# Patient Record
Sex: MALE | Race: WHITE | Employment: FULL TIME | URBAN - METROPOLITAN AREA
[De-identification: names, ages, dates, MRNs, and addresses within clinical notes are randomized per-mention and may not be internally consistent; named-entity substitution may affect disease eponyms.]

---

## 2023-10-16 ENCOUNTER — HOSPITAL ENCOUNTER (EMERGENCY)
Age: 28
Discharge: HOME | End: 2023-10-17
Payer: COMMERCIAL

## 2023-10-16 VITALS
WEIGHT: 190 LBS | BODY MASS INDEX: 29.82 KG/M2 | OXYGEN SATURATION: 98 % | SYSTOLIC BLOOD PRESSURE: 123 MMHG | HEART RATE: 59 BPM | TEMPERATURE: 98.6 F | DIASTOLIC BLOOD PRESSURE: 64 MMHG | RESPIRATION RATE: 18 BRPM | HEIGHT: 67 IN

## 2023-10-16 DIAGNOSIS — S05.02XA ABRASION OF LEFT CORNEA, INITIAL ENCOUNTER: Primary | ICD-10-CM

## 2023-10-16 PROCEDURE — 99283 EMERGENCY DEPT VISIT LOW MDM: CPT

## 2023-10-16 ASSESSMENT — PAIN DESCRIPTION - ONSET: ONSET: ON-GOING

## 2023-10-16 ASSESSMENT — PAIN DESCRIPTION - DESCRIPTORS: DESCRIPTORS: DISCOMFORT

## 2023-10-16 ASSESSMENT — PAIN DESCRIPTION - LOCATION: LOCATION: EYE;HEAD

## 2023-10-16 ASSESSMENT — PAIN DESCRIPTION - ORIENTATION: ORIENTATION: LEFT

## 2023-10-16 ASSESSMENT — PAIN DESCRIPTION - FREQUENCY: FREQUENCY: CONTINUOUS

## 2023-10-16 ASSESSMENT — PAIN SCALES - GENERAL: PAINLEVEL_OUTOF10: 3

## 2023-10-17 PROCEDURE — 6370000000 HC RX 637 (ALT 250 FOR IP): Performed by: PHYSICIAN ASSISTANT

## 2023-10-17 RX ORDER — TETRACAINE HYDROCHLORIDE 5 MG/ML
2 SOLUTION OPHTHALMIC ONCE
Status: DISCONTINUED | OUTPATIENT
Start: 2023-10-17 | End: 2023-10-17 | Stop reason: HOSPADM

## 2023-10-17 RX ORDER — BACITRACIN ZINC AND POLYMYXIN B SULFATE 500; 10000 [USP'U]/G; [USP'U]/G
OINTMENT OPHTHALMIC 3 TIMES DAILY
Status: DISCONTINUED | OUTPATIENT
Start: 2023-10-17 | End: 2023-10-17 | Stop reason: HOSPADM

## 2023-10-17 RX ORDER — ERYTHROMYCIN 5 MG/G
OINTMENT OPHTHALMIC ONCE
Status: DISCONTINUED | OUTPATIENT
Start: 2023-10-17 | End: 2023-10-17

## 2023-10-17 RX ADMIN — BACITRACIN ZINC AND POLYMYXIN B SULFATE: 500; 10000 OINTMENT OPHTHALMIC at 01:21

## 2023-10-17 ASSESSMENT — ENCOUNTER SYMPTOMS
VOMITING: 0
EYE PAIN: 1
NAUSEA: 0
COUGH: 0
EYE REDNESS: 1
ABDOMINAL PAIN: 0
PHOTOPHOBIA: 1
VOICE CHANGE: 0
EYE DISCHARGE: 1

## 2023-10-17 ASSESSMENT — VISUAL ACUITY: OU: 1

## 2023-10-17 NOTE — DISCHARGE INSTRUCTIONS
Follow-up with ophthalmology tomorrow. Return to if any symptoms worsen or new symptoms develop. Continue Polysporin ophthalmic ointment 3 times daily for 5 days or until ophthalmology discontinues. Not drive or operate equipment until released by ophthalmology.

## 2023-10-17 NOTE — ED PROVIDER NOTES
[facility] ED  eMERGENCY dEPARTMENT eNCOUnter      Pt Name: Ward Nunez  MRN: [id number]  [address] 1995  Date of evaluation: 10/16/2023  Provider: Alyson Holguin PA-C    CHIEF COMPLAINT       Chief Complaint   Patient presents with    Foreign Body in Eye     Poss piece of metal         HISTORY OF PRESENT ILLNESS   (Location/Symptom, Timing/Onset,Context/Setting, Quality, Duration, Modifying Factors, Severity)  Note limiting factors. Ward Nunez is a 32 y.o. male who presents to the emergency department patient scratched eye with a piece of metal this morning. He notes that he has pain and dizziness his wife at bedside notes he has had blurred vision trouble seeing. Patient states that a piece of wire poked in the eye denies fever chills nausea vomiting. Symptoms moderate severity. HPI    NursingNotes were reviewed. REVIEW OF SYSTEMS    (2-9 systems for level 4, 10 or more for level 5)     Review of Systems   Constitutional:  Negative for activity change, appetite change and unexpected weight change. HENT:  Negative for ear discharge, nosebleeds and voice change. Eyes:  Positive for photophobia, pain, discharge, redness and visual disturbance. Respiratory:  Negative for cough. Cardiovascular:  Negative for chest pain. Gastrointestinal:  Negative for abdominal pain, nausea and vomiting. Musculoskeletal:  Negative for joint swelling. Skin:  Negative for pallor. Neurological:  Positive for light-headedness. Negative for weakness. Hematological:  Does not bruise/bleed easily. Psychiatric/Behavioral:  Negative for behavioral problems, self-injury and sleep disturbance. All other systems reviewed and are negative. Except as noted above the remainder of the review of systems was reviewed and negative. PAST MEDICAL HISTORY   No past medical history on file. SURGICALHISTORY     No past surgical history on file.       CURRENT MEDICATIONS     There are

## 2023-10-17 NOTE — ED NOTES
Visual acuity screening completed.  Right eye 20/20, left eye 20/25     Babita [place], [address]  10/17/23 0007

## 2023-11-01 ENCOUNTER — APPOINTMENT (OUTPATIENT)
Dept: PRIMARY CARE | Facility: CLINIC | Age: 28
End: 2023-11-01
Payer: COMMERCIAL

## 2023-11-04 ENCOUNTER — OFFICE VISIT (OUTPATIENT)
Dept: PRIMARY CARE | Facility: CLINIC | Age: 28
End: 2023-11-04
Payer: COMMERCIAL

## 2023-11-04 VITALS
SYSTOLIC BLOOD PRESSURE: 102 MMHG | DIASTOLIC BLOOD PRESSURE: 66 MMHG | HEART RATE: 64 BPM | OXYGEN SATURATION: 99 % | TEMPERATURE: 97.3 F | WEIGHT: 190 LBS | BODY MASS INDEX: 29.82 KG/M2 | HEIGHT: 67 IN

## 2023-11-04 DIAGNOSIS — H66.93 ACUTE INFECTION OF BOTH EARS: ICD-10-CM

## 2023-11-04 DIAGNOSIS — H92.03 OTALGIA OF BOTH EARS: ICD-10-CM

## 2023-11-04 DIAGNOSIS — H66.003 NON-RECURRENT ACUTE SUPPURATIVE OTITIS MEDIA OF BOTH EARS WITHOUT SPONTANEOUS RUPTURE OF TYMPANIC MEMBRANES: Primary | ICD-10-CM

## 2023-11-04 PROBLEM — F90.9 ADHD (ATTENTION DEFICIT HYPERACTIVITY DISORDER): Status: ACTIVE | Noted: 2023-11-04

## 2023-11-04 PROBLEM — N52.9 ERECTILE DYSFUNCTION: Status: ACTIVE | Noted: 2023-11-04

## 2023-11-04 PROBLEM — E55.9 VITAMIN D DEFICIENCY: Status: ACTIVE | Noted: 2023-11-04

## 2023-11-04 PROBLEM — F41.1 GENERALIZED ANXIETY DISORDER: Status: ACTIVE | Noted: 2023-11-04

## 2023-11-04 PROBLEM — K21.9 GASTROESOPHAGEAL REFLUX DISEASE: Status: ACTIVE | Noted: 2020-02-06

## 2023-11-04 PROBLEM — F32.A DEPRESSION: Status: ACTIVE | Noted: 2021-03-08

## 2023-11-04 PROCEDURE — 99214 OFFICE O/P EST MOD 30 MIN: CPT | Performed by: NURSE PRACTITIONER

## 2023-11-04 RX ORDER — SERTRALINE HYDROCHLORIDE 100 MG/1
100 TABLET, FILM COATED ORAL DAILY
COMMUNITY
End: 2024-02-22 | Stop reason: SDUPTHER

## 2023-11-04 RX ORDER — AMOXICILLIN 500 MG/1
500 CAPSULE ORAL EVERY 8 HOURS SCHEDULED
Qty: 21 CAPSULE | Refills: 0 | Status: SHIPPED | OUTPATIENT
Start: 2023-11-04 | End: 2023-11-11

## 2023-11-04 ASSESSMENT — PATIENT HEALTH QUESTIONNAIRE - PHQ9
2. FEELING DOWN, DEPRESSED OR HOPELESS: NOT AT ALL
1. LITTLE INTEREST OR PLEASURE IN DOING THINGS: NOT AT ALL
SUM OF ALL RESPONSES TO PHQ9 QUESTIONS 1 AND 2: 0

## 2023-11-04 ASSESSMENT — ENCOUNTER SYMPTOMS
LOSS OF SENSATION IN FEET: 0
DEPRESSION: 0
OCCASIONAL FEELINGS OF UNSTEADINESS: 0

## 2023-11-04 NOTE — PATIENT INSTRUCTIONS
DISCHARGE SUMMARY:   Patient was seen and examined. Diagnosis, treatment, treatment options, and possible complications of today's illness discussed and explained to patient. Patient to take medication/s associated with this visit. Patient may take OTC decongestant of choice as needed. Patient may also take OTC analgesic/antipyretic if needed for pain/fever. Advised to avoid ear manipulation / cleaning. Advised to avoid submerging on water. Advised to increase oral fluid intake. Advised to come back if with worsening or persistent symptoms. Patient verbalized understanding of plan of care.    Patient to come back in 7 - 10 days if needed for worsening symptoms.

## 2023-11-04 NOTE — PROGRESS NOTES
Subjective   Patient ID: Mac Perez is a 28 y.o. male who presents for Earache.    Patient presents today for evaluation of right ear pain. Onset was approximately 1 week ago. States he is not having a lot of pain, more hearing loss and at times he has a sharp pain and some pressure. States his left ear is starting to bother him a little. Denies any other symptoms     Earache          Review of Systems   HENT:  Positive for ear pain.        Objective   There were no vitals taken for this visit.    Physical Exam    Assessment/Plan

## 2023-11-04 NOTE — PROGRESS NOTES
Subjective   Patient ID: Mac Perez is a 28 y.o. male who is with chief complaint of pain on both ears (R>L).     HPI  Patient is a 28 y.o. male who CONSULTED AT [facility] today. Patient is with complaints of bilateral ear pain with right ear more painful than the left ear preceded by nasal congestion, nasal discharge, throat irritation, and  cough. Patient states that present condition started about 1 week ago as nasal congestion, watery nasal discharge, throat irritation and cough which was accompanied today by pain on both ears. He states these symptoms were not accompanied by fever, chills, nor any ear discharge. He denies shortness of breath, chest pain, palpitations, nor edema. He states that he tried OTC cough and cold medications which afforded relief of symptoms of runny nose, cough, and sore throat but the ear pain are still there. He denies nausea, vomiting, abdominal pain, nor any other symptoms.    Patient states he had his COVID vaccine.  Patient states he have not yet received flu shot for this season.    Review of Systems  General: no weight loss, generally healthy, no fatigue  Head:  no headaches / sinus pain, no vertigo, no injury  Eyes: no diplopia, no tearing, no pain,   Ears: (+) ear pain (R>L), no tinnitus, no bleeding, no vertigo  Mouth:  no dental difficulties, no gingival bleeding, (+) hx throat irritation, no loss of sense of taste  Nose: (+) hx congestion, (+) hx discharge, no bleeding, no obstruction, no loss of sense of smell  Neck: no stiffness, no pain, no tenderness, no masses, no bruit  Pulmonary: no dyspnea, no wheezing, no hemoptysis, (+) hx cough  Cardiovascular: no chest pain, no palpitations, no syncope, no orthopnea  Gastrointestinal: no change in appetite, no dysphagia, no abdominal pains, no diarrhea, no emesis, no melena  Genito Urinary: no dysuria, no urinary urgency, no nocturia, no incontinence, no change in nature of urine  Musculoskeletal: no  muscle ache, no joint pain, no limitation of range of motion, no paresthesia, no numbness  Constitutional: no fever, no chills, no night sweats    Objective   Physical Exam  General: ambulatory, in no acute distress  Head: normocephalic, no lesions  Eyes: pink palpebral conjunctiva, anicteric sclerae,  Ears: RIGHT AND LEFT EARS: EAC clear, no erythema, no congestion, no discharge, no bleeding; TM (+) bulging, (+) fluid level, (+) erythema and congestion of TM, no tragal tenderness,  Nose: nasal mucosa normal, no nasal discharge, no bleeding, no obstruction  Throat: clear, no exudate, no lesions  Neck: supple, no masses, no bruits  Chest: symmetrical chest expansion, no lagging, no retractions, clear breath sounds, no rales, no wheezes    Assessment/Plan   Problem List Items Addressed This Visit    None  Visit Diagnoses         Codes    Non-recurrent acute suppurative otitis media of both ears without spontaneous rupture of tympanic membranes    -  Primary H66.003    Relevant Medications    amoxicillin (Amoxil) 500 mg capsule    Otalgia of both ears     H92.03    Relevant Medications    amoxicillin (Amoxil) 500 mg capsule    Acute infection of both ears     H66.93    Relevant Medications    amoxicillin (Amoxil) 500 mg capsule        DISCHARGE SUMMARY:   Patient was seen and examined. Diagnosis, treatment, treatment options, and possible complications of today's illness discussed and explained to patient. Patient to take medication/s associated with this visit. Patient may take OTC decongestant of choice as needed. Patient may also take OTC analgesic/antipyretic if needed for pain/fever. Advised to avoid ear manipulation / cleaning. Advised to avoid submerging on water. Advised to increase oral fluid intake. Advised to come back if with worsening or persistent symptoms. Patient verbalized understanding of plan of care.    Patient to come back in 7 - 10 days if needed for worsening symptoms.

## 2023-11-06 ENCOUNTER — APPOINTMENT (OUTPATIENT)
Dept: PRIMARY CARE | Facility: CLINIC | Age: 28
End: 2023-11-06
Payer: COMMERCIAL

## 2023-12-11 ENCOUNTER — LAB (OUTPATIENT)
Dept: LAB | Facility: LAB | Age: 28
End: 2023-12-11
Payer: COMMERCIAL

## 2023-12-11 DIAGNOSIS — E55.9 VITAMIN D DEFICIENCY, UNSPECIFIED: Primary | ICD-10-CM

## 2023-12-11 LAB
25(OH)D3 SERPL-MCNC: 15 NG/ML (ref 30–100)
ALBUMIN SERPL BCP-MCNC: 4.7 G/DL (ref 3.4–5)
ALP SERPL-CCNC: 61 U/L (ref 33–120)
ALT SERPL W P-5'-P-CCNC: 14 U/L (ref 10–52)
ANION GAP SERPL CALC-SCNC: 11 MMOL/L (ref 10–20)
AST SERPL W P-5'-P-CCNC: 21 U/L (ref 9–39)
BASOPHILS # BLD AUTO: 0.05 X10*3/UL (ref 0–0.1)
BASOPHILS NFR BLD AUTO: 0.7 %
BILIRUB SERPL-MCNC: 0.4 MG/DL (ref 0–1.2)
BUN SERPL-MCNC: 15 MG/DL (ref 6–23)
CALCIUM SERPL-MCNC: 9.9 MG/DL (ref 8.6–10.3)
CHLORIDE SERPL-SCNC: 103 MMOL/L (ref 98–107)
CO2 SERPL-SCNC: 32 MMOL/L (ref 21–32)
CREAT SERPL-MCNC: 1.02 MG/DL (ref 0.5–1.3)
EOSINOPHIL # BLD AUTO: 0.06 X10*3/UL (ref 0–0.7)
EOSINOPHIL NFR BLD AUTO: 0.9 %
ERYTHROCYTE [DISTWIDTH] IN BLOOD BY AUTOMATED COUNT: 11.7 % (ref 11.5–14.5)
GFR SERPL CREATININE-BSD FRML MDRD: >90 ML/MIN/1.73M*2
GLUCOSE SERPL-MCNC: 78 MG/DL (ref 74–99)
HCT VFR BLD AUTO: 46.2 % (ref 41–52)
HGB BLD-MCNC: 15.6 G/DL (ref 13.5–17.5)
IMM GRANULOCYTES # BLD AUTO: 0.01 X10*3/UL (ref 0–0.7)
IMM GRANULOCYTES NFR BLD AUTO: 0.1 % (ref 0–0.9)
LYMPHOCYTES # BLD AUTO: 1.67 X10*3/UL (ref 1.2–4.8)
LYMPHOCYTES NFR BLD AUTO: 24.3 %
MCH RBC QN AUTO: 31.1 PG (ref 26–34)
MCHC RBC AUTO-ENTMCNC: 33.8 G/DL (ref 32–36)
MCV RBC AUTO: 92 FL (ref 80–100)
MONOCYTES # BLD AUTO: 0.45 X10*3/UL (ref 0.1–1)
MONOCYTES NFR BLD AUTO: 6.5 %
NEUTROPHILS # BLD AUTO: 4.64 X10*3/UL (ref 1.2–7.7)
NEUTROPHILS NFR BLD AUTO: 67.5 %
NRBC BLD-RTO: 0 /100 WBCS (ref 0–0)
PLATELET # BLD AUTO: 283 X10*3/UL (ref 150–450)
POTASSIUM SERPL-SCNC: 4.2 MMOL/L (ref 3.5–5.3)
PROT SERPL-MCNC: 7.2 G/DL (ref 6.4–8.2)
RBC # BLD AUTO: 5.02 X10*6/UL (ref 4.5–5.9)
SODIUM SERPL-SCNC: 142 MMOL/L (ref 136–145)
WBC # BLD AUTO: 6.9 X10*3/UL (ref 4.4–11.3)

## 2023-12-11 PROCEDURE — 85025 COMPLETE CBC W/AUTO DIFF WBC: CPT

## 2023-12-11 PROCEDURE — 80053 COMPREHEN METABOLIC PANEL: CPT

## 2023-12-11 PROCEDURE — 82306 VITAMIN D 25 HYDROXY: CPT

## 2023-12-11 PROCEDURE — 36415 COLL VENOUS BLD VENIPUNCTURE: CPT

## 2023-12-13 ENCOUNTER — OFFICE VISIT (OUTPATIENT)
Dept: PRIMARY CARE | Facility: CLINIC | Age: 28
End: 2023-12-13
Payer: COMMERCIAL

## 2023-12-13 VITALS
TEMPERATURE: 98.3 F | BODY MASS INDEX: 30.57 KG/M2 | HEIGHT: 67 IN | RESPIRATION RATE: 16 BRPM | HEART RATE: 52 BPM | DIASTOLIC BLOOD PRESSURE: 76 MMHG | OXYGEN SATURATION: 97 % | WEIGHT: 194.8 LBS | SYSTOLIC BLOOD PRESSURE: 121 MMHG

## 2023-12-13 DIAGNOSIS — Z00.00 ROUTINE GENERAL MEDICAL EXAMINATION AT A HEALTH CARE FACILITY: Primary | ICD-10-CM

## 2023-12-13 PROCEDURE — 99395 PREV VISIT EST AGE 18-39: CPT | Performed by: FAMILY MEDICINE

## 2023-12-13 PROCEDURE — 1036F TOBACCO NON-USER: CPT | Performed by: FAMILY MEDICINE

## 2023-12-13 ASSESSMENT — ENCOUNTER SYMPTOMS
BLOOD IN STOOL: 0
SINUS PRESSURE: 0
SHORTNESS OF BREATH: 0
SLEEP DISTURBANCE: 0
BRUISES/BLEEDS EASILY: 0
HALLUCINATIONS: 0
ARTHRALGIAS: 0
POLYDIPSIA: 0
UNEXPECTED WEIGHT CHANGE: 0
ABDOMINAL DISTENTION: 0
DYSPHORIC MOOD: 0
EYE DISCHARGE: 0
RHINORRHEA: 0
LIGHT-HEADEDNESS: 0
SORE THROAT: 0
SPEECH DIFFICULTY: 0
SEIZURES: 0
DIARRHEA: 0
PALPITATIONS: 0
FLANK PAIN: 0
ABDOMINAL PAIN: 0
CHILLS: 0
DIAPHORESIS: 0
HEADACHES: 0
VOICE CHANGE: 0
NUMBNESS: 0
FREQUENCY: 0
ADENOPATHY: 0
ACTIVITY CHANGE: 0
PHOTOPHOBIA: 0
WHEEZING: 0
NECK PAIN: 0
EYE ITCHING: 0
MYALGIAS: 0
TREMORS: 0
CONFUSION: 0
NAUSEA: 0
EYE PAIN: 0
BACK PAIN: 0
WEAKNESS: 0
FEVER: 0
DYSURIA: 0
CONSTIPATION: 0
CHOKING: 0
NECK STIFFNESS: 0
TROUBLE SWALLOWING: 0
EYE REDNESS: 0
HEMATURIA: 0
JOINT SWELLING: 0
FATIGUE: 0
DIZZINESS: 0
VOMITING: 0
WOUND: 0
FACIAL ASYMMETRY: 0
AGITATION: 0
APPETITE CHANGE: 0
COUGH: 0
NERVOUS/ANXIOUS: 0
CHEST TIGHTNESS: 0

## 2023-12-13 NOTE — PROGRESS NOTES
Subjective   Patient ID: Mac Perez is a 28 y.o. male who presents for Annual Exam (And review labs).    Subjective  Mac Perez is a 28 y.o. male and is here for a comprehensive physical exam. The patient reports no problems.    Do you take any herbs or supplements that were not prescribed by a doctor? no  Are you taking calcium supplements? no  Are you taking aspirin daily? no      History:  Any STD's in the past? none           Review of Systems   Constitutional:  Negative for activity change, appetite change, chills, diaphoresis, fatigue, fever and unexpected weight change.   HENT:  Negative for congestion, ear pain, hearing loss, nosebleeds, postnasal drip, rhinorrhea, sinus pressure, sneezing, sore throat, tinnitus, trouble swallowing and voice change.    Eyes:  Negative for photophobia, pain, discharge, redness, itching and visual disturbance.   Respiratory:  Negative for cough, choking, chest tightness, shortness of breath and wheezing.    Cardiovascular:  Negative for chest pain, palpitations and leg swelling.   Gastrointestinal:  Negative for abdominal distention, abdominal pain, blood in stool, constipation, diarrhea, nausea and vomiting.   Endocrine: Negative for cold intolerance, heat intolerance, polydipsia and polyuria.   Genitourinary:  Negative for dysuria, flank pain, frequency, hematuria and urgency.   Musculoskeletal:  Negative for arthralgias, back pain, joint swelling, myalgias, neck pain and neck stiffness.   Skin:  Negative for rash and wound.   Allergic/Immunologic: Negative for immunocompromised state.   Neurological:  Negative for dizziness, tremors, seizures, syncope, facial asymmetry, speech difficulty, weakness, light-headedness, numbness and headaches.   Hematological:  Negative for adenopathy. Does not bruise/bleed easily.   Psychiatric/Behavioral:  Negative for agitation, behavioral problems, confusion, dysphoric mood, hallucinations, self-injury, sleep disturbance and  "suicidal ideas. The patient is not nervous/anxious.        Objective   /76 (BP Location: Left arm, Patient Position: Sitting)   Pulse 52   Temp 36.8 °C (98.3 °F)   Resp 16   Ht 1.702 m (5' 7\")   Wt 88.4 kg (194 lb 12.8 oz)   SpO2 97%   BMI 30.51 kg/m²     Physical Exam  Constitutional:       General: He is not in acute distress.     Appearance: He is not ill-appearing or diaphoretic.   HENT:      Head: Normocephalic and atraumatic.      Right Ear: External ear normal.      Left Ear: External ear normal.      Nose: Nose normal. No rhinorrhea.   Eyes:      General: Lids are normal. No scleral icterus.        Right eye: No discharge.         Left eye: No discharge.      Conjunctiva/sclera: Conjunctivae normal.   Cardiovascular:      Rate and Rhythm: Normal rate and regular rhythm.      Pulses: Normal pulses.      Heart sounds: No murmur heard.  Pulmonary:      Effort: Pulmonary effort is normal. No respiratory distress.      Breath sounds: No decreased breath sounds, wheezing, rhonchi or rales.   Abdominal:      General: Bowel sounds are normal. There is no distension.      Palpations: Abdomen is soft. There is no mass.      Tenderness: There is no abdominal tenderness. There is no guarding or rebound.   Musculoskeletal:         General: No swelling, tenderness or deformity.      Cervical back: No rigidity or tenderness.      Right lower leg: No edema.      Left lower leg: No edema.   Lymphadenopathy:      Cervical: No cervical adenopathy.      Upper Body:      Right upper body: No supraclavicular adenopathy.      Left upper body: No supraclavicular adenopathy.   Skin:     General: Skin is warm and dry.      Coloration: Skin is not jaundiced or pale.      Findings: No erythema, lesion or rash.   Neurological:      General: No focal deficit present.      Mental Status: He is alert and oriented to person, place, and time.      Sensory: No sensory deficit.      Motor: No weakness or tremor.      Coordination: " Coordination normal.      Gait: Gait normal.   Psychiatric:         Mood and Affect: Mood normal. Affect is not inappropriate.         Behavior: Behavior normal.         Assessment/Plan   Diagnoses and all orders for this visit:  Routine general medical examination at a health care facility    2. Patient Counseling:  --Nutrition: Stressed importance of moderation in sodium/caffeine intake, saturated fat and cholesterol, caloric balance, sufficient intake of fresh fruits, vegetables, fiber, calcium, iron.  --Exercise: Stressed the importance of regular exercise.   --Substance Abuse: Discussed cessation/primary prevention of tobacco, alcohol, or other drug use; driving or other dangerous activities under the influence; availability of treatment for abuse.    --Sexuality: Discussed sexually transmitted diseases, partner selection, use of condoms, avoidance of unintended pregnancy  and contraceptive alternatives.   --Injury prevention: Discussed safety belts, safety helmets, smoke detector, smoking near bedding or upholstery.   --Dental health: Discussed importance of regular tooth brushing, flossing, and dental visits.  --Immunizations reviewed.  --Discussed benefits of screening colonoscopy.  3. Discussed the patient's BMI with him.  The BMI is above average. The patient received Current weight: 88.4 kg (194 lb 12.8 oz)  Weight change since last visit (-) denotes wt loss 4.8 lbs   Weight loss needed to achieve BMI 25: 35.5 Lbs  Weight loss needed to achieve BMI 30: 3.7 Lbs    Provided instructions on dietary changes  Provided instructions on exercise  Advised to Increase physical activity because they have an above normal BMI.  4. Follow up in one year

## 2024-02-22 DIAGNOSIS — F32.A DEPRESSION, UNSPECIFIED DEPRESSION TYPE: Primary | ICD-10-CM

## 2024-02-22 RX ORDER — SERTRALINE HYDROCHLORIDE 100 MG/1
100 TABLET, FILM COATED ORAL DAILY
Qty: 90 TABLET | Refills: 3 | Status: SHIPPED | OUTPATIENT
Start: 2024-02-22 | End: 2025-02-21

## 2024-07-13 ENCOUNTER — APPOINTMENT (OUTPATIENT)
Dept: RADIOLOGY | Facility: HOSPITAL | Age: 29
End: 2024-07-13
Payer: COMMERCIAL

## 2024-07-13 ENCOUNTER — HOSPITAL ENCOUNTER (EMERGENCY)
Facility: HOSPITAL | Age: 29
Discharge: HOME | End: 2024-07-14
Attending: EMERGENCY MEDICINE
Payer: COMMERCIAL

## 2024-07-13 DIAGNOSIS — L03.115 CELLULITIS OF RIGHT LOWER EXTREMITY: Primary | ICD-10-CM

## 2024-07-13 LAB
ALBUMIN SERPL BCP-MCNC: 4.3 G/DL (ref 3.4–5)
ALP SERPL-CCNC: 50 U/L (ref 33–120)
ALT SERPL W P-5'-P-CCNC: 14 U/L (ref 10–52)
ANION GAP SERPL CALC-SCNC: 18 MMOL/L (ref 10–20)
AST SERPL W P-5'-P-CCNC: 18 U/L (ref 9–39)
BASOPHILS # BLD AUTO: 0.03 X10*3/UL (ref 0–0.1)
BASOPHILS NFR BLD AUTO: 0.2 %
BILIRUB SERPL-MCNC: 0.6 MG/DL (ref 0–1.2)
BUN SERPL-MCNC: 14 MG/DL (ref 6–23)
CALCIUM SERPL-MCNC: 9.5 MG/DL (ref 8.6–10.3)
CHLORIDE SERPL-SCNC: 102 MMOL/L (ref 98–107)
CO2 SERPL-SCNC: 21 MMOL/L (ref 21–32)
CREAT SERPL-MCNC: 1.04 MG/DL (ref 0.5–1.3)
EGFRCR SERPLBLD CKD-EPI 2021: >90 ML/MIN/1.73M*2
EOSINOPHIL # BLD AUTO: 0 X10*3/UL (ref 0–0.7)
EOSINOPHIL NFR BLD AUTO: 0 %
ERYTHROCYTE [DISTWIDTH] IN BLOOD BY AUTOMATED COUNT: 12.4 % (ref 11.5–14.5)
GLUCOSE SERPL-MCNC: 94 MG/DL (ref 74–99)
HCT VFR BLD AUTO: 41 % (ref 41–52)
HGB BLD-MCNC: 14.8 G/DL (ref 13.5–17.5)
IMM GRANULOCYTES # BLD AUTO: 0.05 X10*3/UL (ref 0–0.7)
IMM GRANULOCYTES NFR BLD AUTO: 0.4 % (ref 0–0.9)
LYMPHOCYTES # BLD AUTO: 0.24 X10*3/UL (ref 1.2–4.8)
LYMPHOCYTES NFR BLD AUTO: 1.9 %
MCH RBC QN AUTO: 32.3 PG (ref 26–34)
MCHC RBC AUTO-ENTMCNC: 36.1 G/DL (ref 32–36)
MCV RBC AUTO: 90 FL (ref 80–100)
MONOCYTES # BLD AUTO: 0.4 X10*3/UL (ref 0.1–1)
MONOCYTES NFR BLD AUTO: 3.1 %
NEUTROPHILS # BLD AUTO: 12.18 X10*3/UL (ref 1.2–7.7)
NEUTROPHILS NFR BLD AUTO: 94.4 %
NRBC BLD-RTO: 0 /100 WBCS (ref 0–0)
PLATELET # BLD AUTO: 172 X10*3/UL (ref 150–450)
POTASSIUM SERPL-SCNC: 3.7 MMOL/L (ref 3.5–5.3)
PROT SERPL-MCNC: 6.8 G/DL (ref 6.4–8.2)
RBC # BLD AUTO: 4.58 X10*6/UL (ref 4.5–5.9)
SODIUM SERPL-SCNC: 137 MMOL/L (ref 136–145)
WBC # BLD AUTO: 12.9 X10*3/UL (ref 4.4–11.3)

## 2024-07-13 PROCEDURE — 87040 BLOOD CULTURE FOR BACTERIA: CPT | Mod: PARLAB | Performed by: EMERGENCY MEDICINE

## 2024-07-13 PROCEDURE — 83605 ASSAY OF LACTIC ACID: CPT | Performed by: EMERGENCY MEDICINE

## 2024-07-13 PROCEDURE — 2500000004 HC RX 250 GENERAL PHARMACY W/ HCPCS (ALT 636 FOR OP/ED): Performed by: EMERGENCY MEDICINE

## 2024-07-13 PROCEDURE — 99284 EMERGENCY DEPT VISIT MOD MDM: CPT | Mod: 25

## 2024-07-13 PROCEDURE — 71045 X-RAY EXAM CHEST 1 VIEW: CPT

## 2024-07-13 PROCEDURE — 36415 COLL VENOUS BLD VENIPUNCTURE: CPT | Performed by: EMERGENCY MEDICINE

## 2024-07-13 PROCEDURE — 85025 COMPLETE CBC W/AUTO DIFF WBC: CPT | Performed by: EMERGENCY MEDICINE

## 2024-07-13 PROCEDURE — 84075 ASSAY ALKALINE PHOSPHATASE: CPT | Performed by: EMERGENCY MEDICINE

## 2024-07-13 PROCEDURE — 93971 EXTREMITY STUDY: CPT

## 2024-07-13 PROCEDURE — 71045 X-RAY EXAM CHEST 1 VIEW: CPT | Performed by: RADIOLOGY

## 2024-07-13 PROCEDURE — 96365 THER/PROPH/DIAG IV INF INIT: CPT

## 2024-07-13 PROCEDURE — 96361 HYDRATE IV INFUSION ADD-ON: CPT

## 2024-07-13 RX ORDER — VANCOMYCIN HYDROCHLORIDE 1 G/20ML
INJECTION, POWDER, LYOPHILIZED, FOR SOLUTION INTRAVENOUS DAILY PRN
Status: DISCONTINUED | OUTPATIENT
Start: 2024-07-13 | End: 2024-07-13

## 2024-07-13 RX ORDER — ACETAMINOPHEN 325 MG/1
975 TABLET ORAL ONCE
Status: COMPLETED | OUTPATIENT
Start: 2024-07-13 | End: 2024-07-13

## 2024-07-13 RX ORDER — CEFTRIAXONE 1 G/50ML
1 INJECTION, SOLUTION INTRAVENOUS ONCE
Status: COMPLETED | OUTPATIENT
Start: 2024-07-13 | End: 2024-07-14

## 2024-07-13 ASSESSMENT — PAIN DESCRIPTION - LOCATION: LOCATION: LEG

## 2024-07-13 ASSESSMENT — COLUMBIA-SUICIDE SEVERITY RATING SCALE - C-SSRS
6. HAVE YOU EVER DONE ANYTHING, STARTED TO DO ANYTHING, OR PREPARED TO DO ANYTHING TO END YOUR LIFE?: NO
2. HAVE YOU ACTUALLY HAD ANY THOUGHTS OF KILLING YOURSELF?: NO
1. IN THE PAST MONTH, HAVE YOU WISHED YOU WERE DEAD OR WISHED YOU COULD GO TO SLEEP AND NOT WAKE UP?: NO

## 2024-07-13 ASSESSMENT — PAIN - FUNCTIONAL ASSESSMENT: PAIN_FUNCTIONAL_ASSESSMENT: 0-10

## 2024-07-13 ASSESSMENT — PAIN DESCRIPTION - PAIN TYPE: TYPE: ACUTE PAIN

## 2024-07-13 ASSESSMENT — PAIN SCALES - GENERAL: PAINLEVEL_OUTOF10: 7

## 2024-07-13 ASSESSMENT — PAIN DESCRIPTION - ORIENTATION: ORIENTATION: RIGHT

## 2024-07-14 VITALS
RESPIRATION RATE: 16 BRPM | HEIGHT: 67 IN | OXYGEN SATURATION: 99 % | TEMPERATURE: 99.7 F | DIASTOLIC BLOOD PRESSURE: 66 MMHG | SYSTOLIC BLOOD PRESSURE: 120 MMHG | HEART RATE: 89 BPM | BODY MASS INDEX: 29.82 KG/M2 | WEIGHT: 190 LBS

## 2024-07-14 LAB
APPEARANCE UR: CLEAR
BILIRUB UR STRIP.AUTO-MCNC: NEGATIVE MG/DL
COLOR UR: ABNORMAL
GLUCOSE UR STRIP.AUTO-MCNC: NORMAL MG/DL
HOLD SPECIMEN: NORMAL
KETONES UR STRIP.AUTO-MCNC: ABNORMAL MG/DL
LACTATE SERPL-SCNC: 1.1 MMOL/L (ref 0.4–2)
LEUKOCYTE ESTERASE UR QL STRIP.AUTO: NEGATIVE
NITRITE UR QL STRIP.AUTO: NEGATIVE
PH UR STRIP.AUTO: 5.5 [PH]
PROT UR STRIP.AUTO-MCNC: NEGATIVE MG/DL
RBC # UR STRIP.AUTO: NEGATIVE /UL
SP GR UR STRIP.AUTO: 1.02
UROBILINOGEN UR STRIP.AUTO-MCNC: NORMAL MG/DL

## 2024-07-14 PROCEDURE — 2500000005 HC RX 250 GENERAL PHARMACY W/O HCPCS: Performed by: EMERGENCY MEDICINE

## 2024-07-14 PROCEDURE — 81003 URINALYSIS AUTO W/O SCOPE: CPT | Performed by: EMERGENCY MEDICINE

## 2024-07-14 PROCEDURE — 96375 TX/PRO/DX INJ NEW DRUG ADDON: CPT

## 2024-07-14 PROCEDURE — 2500000004 HC RX 250 GENERAL PHARMACY W/ HCPCS (ALT 636 FOR OP/ED): Performed by: EMERGENCY MEDICINE

## 2024-07-14 PROCEDURE — 93971 EXTREMITY STUDY: CPT | Performed by: RADIOLOGY

## 2024-07-14 RX ORDER — SULFAMETHOXAZOLE AND TRIMETHOPRIM 800; 160 MG/1; MG/1
1 TABLET ORAL 2 TIMES DAILY
Qty: 14 TABLET | Refills: 0 | Status: SHIPPED | OUTPATIENT
Start: 2024-07-14 | End: 2024-07-20 | Stop reason: HOSPADM

## 2024-07-14 RX ORDER — CEPHALEXIN 500 MG/1
500 CAPSULE ORAL 4 TIMES DAILY
Qty: 40 CAPSULE | Refills: 0 | Status: SHIPPED | OUTPATIENT
Start: 2024-07-14 | End: 2024-07-20 | Stop reason: HOSPADM

## 2024-07-14 RX ORDER — HYDROCODONE BITARTRATE AND ACETAMINOPHEN 5; 325 MG/1; MG/1
1 TABLET ORAL EVERY 6 HOURS PRN
Qty: 12 TABLET | Refills: 0 | Status: SHIPPED | OUTPATIENT
Start: 2024-07-14 | End: 2024-07-20 | Stop reason: HOSPADM

## 2024-07-14 NOTE — ED PROVIDER NOTES
HPI   Chief Complaint   Patient presents with    Leg Swelling    Leg Pain     PT. ARRIVED VIA PRIVATE CAR, RIDE PROVIDED, TO ED FROM HOME FOR RT LEG  PAIN/SWELLING. PT. STATES RECENT POISON JONA TO RT LEG X1WK, HAS BEEN GOING AWAY. PT. STATES NOTICING SWELLING TO RT LEG TODAY AND IS WARM TO TOUCH, WIFE IS NURSE AND WAS CONCERNED FOR CELLULITIS. PT. HAS + PULSES, FULL ROM, PAIN W/ AMBULATION.       Patient is an otherwise healthy 28-year-old male, denies significant medical history, presents to the emergency department fever, chills, and right leg swelling.  Patient states that about a week ago, he had poison ivy of the leg.  He states that seem to be resolving.  Over the past 24 hours, he began to have increasing redness, pain, along with chills and sweats.  He states today, despite ibuprofen and Tylenol, he was still having persistent fever and chills.  He denies any history of immunosuppression.  He is otherwise been in his normal state of health.                          Yanira Coma Scale Score: 15                     Patient History   Past Medical History:   Diagnosis Date    Deficiency of other specified B group vitamins 10/27/2022    Vitamin B12 deficiency     History reviewed. No pertinent surgical history.  Family History   Problem Relation Name Age of Onset    Hypertension Mother      Pancreatic cancer Father      Diabetes Brother       Social History     Tobacco Use    Smoking status: Never     Passive exposure: Never    Smokeless tobacco: Never   Vaping Use    Vaping status: Every Day    Substances: Nicotine   Substance Use Topics    Alcohol use: Yes     Alcohol/week: 5.0 standard drinks of alcohol     Types: 5 Standard drinks or equivalent per week     Comment: occ    Drug use: Never       Physical Exam   ED Triage Vitals [07/13/24 2203]   Temperature Heart Rate Respirations BP   37.6 °C (99.7 °F) 99 18 114/68      Pulse Ox Temp Source Heart Rate Source Patient Position   98 % Temporal Monitor Sitting       BP Location FiO2 (%)     Right arm --       Physical Exam  Vitals and nursing note reviewed.   Constitutional:       General: He is not in acute distress.     Appearance: He is well-developed.   HENT:      Head: Normocephalic and atraumatic.   Eyes:      Conjunctiva/sclera: Conjunctivae normal.   Cardiovascular:      Rate and Rhythm: Normal rate and regular rhythm.      Heart sounds: No murmur heard.  Pulmonary:      Effort: Pulmonary effort is normal. No respiratory distress.      Breath sounds: Normal breath sounds.   Abdominal:      Palpations: Abdomen is soft.      Tenderness: There is no abdominal tenderness.   Musculoskeletal:         General: No swelling.      Cervical back: Neck supple.      Right lower leg: Edema present.      Comments: Mild erythema of the right lower extremity.  Lymphangitic streak.   Skin:     General: Skin is warm and dry.      Capillary Refill: Capillary refill takes less than 2 seconds.   Neurological:      Mental Status: He is alert.   Psychiatric:         Mood and Affect: Mood normal.         ED Course & MDM   ED Course as of 07/14/24 0100   Sat Jul 13, 2024   2315 Leukocytosis of 12.9. [MK]   2337 Metabolic panel unremarkable. [MK]   Sun Jul 14, 2024   0040 Chemistry panel unremarkable.  Lactic acid normal.  Urine shows no infection. [MK]   0053 Ultrasound shows no into DVT. [MK]      ED Course User Index  [MK] Jeffry Galloway MD         Diagnoses as of 07/14/24 0100   Cellulitis of right lower extremity       Medical Decision Making  Medical Decision Making: Patient presents with fever, chills, and evidence of cellulitis.  There is no crepitus that makes me concerned for necrotizing fasciitis.  He did have recent skin breakdown secondary to poison ivy.  Patient did have a low-grade fever so metabolic workup was pursued.  Labs do show mild leukocytosis but are otherwise unremarkable.  Ultrasound was also obtained which does not show any evidence of DVT.  Patient was treated  with IV antibiotics.  I did discuss options with the patient.  He has no history of immunosuppression.  His pain is controlled.  He is not febrile.  He wants to trial outpatient therapy.  I do feel that this is reasonable.  He will be placed on antibiotics.  I did  him that if his symptoms are worsening or not improving within the next 24 hours to return.  He is agreeable with plan of care.    Differential Diagnoses Considered: Cellulitis, lymphangitis, DVT, sepsis    Chronic Medical Conditions Significantly Affecting Care: No significant medical history    External Records Reviewed: I reviewed recent and relevant outside records including: No recent    Independent Interpretation of Studies:  I independently interpreted: It is ultrasound and chest x-ray obtained and reviewed    Escalation of Care:  Appropriate for outpatient management after discussion with    Social Determinants of Health Significantly Affecting Care:  No social determinants    Prescription Drug Consideration: Keflex and Bactrim    Diagnostic testing considered: Noncontributory          Procedure  Procedures     Jeffry Galloway MD  07/14/24 0100

## 2024-07-14 NOTE — ED TRIAGE NOTES
PT. ARRIVED VIA PRIVATE CAR, RIDE PROVIDED, TO ED FROM HOME FOR RT LEG  PAIN/SWELLING. PT. STATES RECENT POISON JONA TO RT LEG X1WK, HAS BEEN GOING AWAY. PT. STATES NOTICING SWELLING TO RT LEG TODAY AND IS WARM TO TOUCH, WIFE IS NURSE AND WAS CONCERNED FOR CELLULITIS. PT. HAS + PULSES, FULL ROM, PAIN W/ AMBULATION.

## 2024-07-14 NOTE — CONSULTS
Vancomycin Dosing by Pharmacy- EMERGENCY DEPARTMENT    Mac Perez is a 28 y.o. year old male who Pharmacy has been consulted to give a ONE TIME ONLY vancomycin dose in the Emergency Department for cellulitis, skin and soft tissue.     Visit Vitals  /68 (BP Location: Right arm, Patient Position: Sitting)   Pulse 99   Temp 37.6 °C (99.7 °F) (Temporal)   Resp 18      Lab Results   Component Value Date    CREATININE 1.04 07/13/2024    CREATININE 1.02 12/11/2023    CREATININE 1.18 10/27/2022      Wt Readings from Last 1 Encounters:   07/13/24 86.2 kg (190 lb)    Assessment/Plan   Patient will be given a one time loading dose of 2000 mg  Pharmacy will sign off at this time. If vancomycin is to be continued, please re-consult Pharmacy.   Rosalba Gagnon [facility]

## 2024-07-15 ENCOUNTER — APPOINTMENT (OUTPATIENT)
Dept: RADIOLOGY | Facility: HOSPITAL | Age: 29
End: 2024-07-15
Payer: COMMERCIAL

## 2024-07-15 ENCOUNTER — APPOINTMENT (OUTPATIENT)
Dept: VASCULAR MEDICINE | Facility: HOSPITAL | Age: 29
End: 2024-07-15
Payer: COMMERCIAL

## 2024-07-15 ENCOUNTER — HOSPITAL ENCOUNTER (INPATIENT)
Facility: HOSPITAL | Age: 29
LOS: 5 days | Discharge: HOME | End: 2024-07-20
Attending: EMERGENCY MEDICINE | Admitting: INTERNAL MEDICINE
Payer: COMMERCIAL

## 2024-07-15 DIAGNOSIS — L03.115 CELLULITIS OF RIGHT LOWER EXTREMITY: ICD-10-CM

## 2024-07-15 DIAGNOSIS — L02.415 ABSCESS OF RIGHT LOWER EXTREMITY: Primary | ICD-10-CM

## 2024-07-15 LAB
ALBUMIN SERPL BCP-MCNC: 3.7 G/DL (ref 3.4–5)
ALP SERPL-CCNC: 67 U/L (ref 33–120)
ALT SERPL W P-5'-P-CCNC: 46 U/L (ref 10–52)
ANION GAP SERPL CALC-SCNC: 10 MMOL/L (ref 10–20)
AST SERPL W P-5'-P-CCNC: 53 U/L (ref 9–39)
BASOPHILS # BLD MANUAL: 0.38 X10*3/UL (ref 0–0.1)
BASOPHILS NFR BLD MANUAL: 4 %
BILIRUB SERPL-MCNC: 0.5 MG/DL (ref 0–1.2)
BUN SERPL-MCNC: 11 MG/DL (ref 6–23)
CALCIUM SERPL-MCNC: 9.1 MG/DL (ref 8.6–10.3)
CHLORIDE SERPL-SCNC: 100 MMOL/L (ref 98–107)
CO2 SERPL-SCNC: 27 MMOL/L (ref 21–32)
CREAT SERPL-MCNC: 1.23 MG/DL (ref 0.5–1.3)
CRP SERPL-MCNC: 29.92 MG/DL
EGFRCR SERPLBLD CKD-EPI 2021: 82 ML/MIN/1.73M*2
EOSINOPHIL # BLD MANUAL: 0.28 X10*3/UL (ref 0–0.7)
EOSINOPHIL NFR BLD MANUAL: 3 %
ERYTHROCYTE [DISTWIDTH] IN BLOOD BY AUTOMATED COUNT: 12.9 % (ref 11.5–14.5)
ERYTHROCYTE [SEDIMENTATION RATE] IN BLOOD BY WESTERGREN METHOD: 8 MM/H (ref 0–15)
GLUCOSE SERPL-MCNC: 117 MG/DL (ref 74–99)
HCT VFR BLD AUTO: 40.8 % (ref 41–52)
HGB BLD-MCNC: 14.4 G/DL (ref 13.5–17.5)
IMM GRANULOCYTES # BLD AUTO: 0.12 X10*3/UL (ref 0–0.7)
IMM GRANULOCYTES NFR BLD AUTO: 1.3 % (ref 0–0.9)
LACTATE SERPL-SCNC: 1 MMOL/L (ref 0.4–2)
LYMPHOCYTES # BLD MANUAL: 0.28 X10*3/UL (ref 1.2–4.8)
LYMPHOCYTES NFR BLD MANUAL: 3 %
MCH RBC QN AUTO: 32.2 PG (ref 26–34)
MCHC RBC AUTO-ENTMCNC: 35.3 G/DL (ref 32–36)
MCV RBC AUTO: 91 FL (ref 80–100)
METAMYELOCYTES # BLD MANUAL: 0.09 X10*3/UL
METAMYELOCYTES NFR BLD MANUAL: 1 %
MONOCYTES # BLD MANUAL: 0.19 X10*3/UL (ref 0.1–1)
MONOCYTES NFR BLD MANUAL: 2 %
NEUTROPHILS # BLD MANUAL: 8.18 X10*3/UL (ref 1.2–7.7)
NEUTS BAND # BLD MANUAL: 4.14 X10*3/UL (ref 0–0.7)
NEUTS BAND NFR BLD MANUAL: 44 %
NEUTS SEG # BLD MANUAL: 4.04 X10*3/UL (ref 1.2–7)
NEUTS SEG NFR BLD MANUAL: 43 %
NRBC BLD-RTO: 0 /100 WBCS (ref 0–0)
PLATELET # BLD AUTO: 150 X10*3/UL (ref 150–450)
POTASSIUM SERPL-SCNC: 3.8 MMOL/L (ref 3.5–5.3)
PROT SERPL-MCNC: 6.7 G/DL (ref 6.4–8.2)
RBC # BLD AUTO: 4.47 X10*6/UL (ref 4.5–5.9)
RBC MORPH BLD: ABNORMAL
SODIUM SERPL-SCNC: 133 MMOL/L (ref 136–145)
TOTAL CELLS COUNTED BLD: 100
WBC # BLD AUTO: 9.4 X10*3/UL (ref 4.4–11.3)

## 2024-07-15 PROCEDURE — 85007 BL SMEAR W/DIFF WBC COUNT: CPT

## 2024-07-15 PROCEDURE — 96375 TX/PRO/DX INJ NEW DRUG ADDON: CPT

## 2024-07-15 PROCEDURE — 2500000005 HC RX 250 GENERAL PHARMACY W/O HCPCS

## 2024-07-15 PROCEDURE — 73552 X-RAY EXAM OF FEMUR 2/>: CPT | Mod: RT

## 2024-07-15 PROCEDURE — 73590 X-RAY EXAM OF LOWER LEG: CPT | Mod: RT

## 2024-07-15 PROCEDURE — 2500000004 HC RX 250 GENERAL PHARMACY W/ HCPCS (ALT 636 FOR OP/ED): Performed by: PHYSICIAN ASSISTANT

## 2024-07-15 PROCEDURE — 36415 COLL VENOUS BLD VENIPUNCTURE: CPT

## 2024-07-15 PROCEDURE — 2500000004 HC RX 250 GENERAL PHARMACY W/ HCPCS (ALT 636 FOR OP/ED)

## 2024-07-15 PROCEDURE — 2500000001 HC RX 250 WO HCPCS SELF ADMINISTERED DRUGS (ALT 637 FOR MEDICARE OP): Performed by: INTERNAL MEDICINE

## 2024-07-15 PROCEDURE — 73552 X-RAY EXAM OF FEMUR 2/>: CPT | Mod: RIGHT SIDE | Performed by: STUDENT IN AN ORGANIZED HEALTH CARE EDUCATION/TRAINING PROGRAM

## 2024-07-15 PROCEDURE — 83605 ASSAY OF LACTIC ACID: CPT

## 2024-07-15 PROCEDURE — 87205 SMEAR GRAM STAIN: CPT | Mod: PARLAB | Performed by: PHYSICIAN ASSISTANT

## 2024-07-15 PROCEDURE — 99285 EMERGENCY DEPT VISIT HI MDM: CPT

## 2024-07-15 PROCEDURE — 86140 C-REACTIVE PROTEIN: CPT | Performed by: PHYSICIAN ASSISTANT

## 2024-07-15 PROCEDURE — 96365 THER/PROPH/DIAG IV INF INIT: CPT

## 2024-07-15 PROCEDURE — 80053 COMPREHEN METABOLIC PANEL: CPT

## 2024-07-15 PROCEDURE — 99223 1ST HOSP IP/OBS HIGH 75: CPT | Performed by: PHYSICIAN ASSISTANT

## 2024-07-15 PROCEDURE — 96361 HYDRATE IV INFUSION ADD-ON: CPT

## 2024-07-15 PROCEDURE — 73590 X-RAY EXAM OF LOWER LEG: CPT | Mod: RIGHT SIDE | Performed by: STUDENT IN AN ORGANIZED HEALTH CARE EDUCATION/TRAINING PROGRAM

## 2024-07-15 PROCEDURE — 87040 BLOOD CULTURE FOR BACTERIA: CPT | Mod: PARLAB

## 2024-07-15 PROCEDURE — 85027 COMPLETE CBC AUTOMATED: CPT

## 2024-07-15 PROCEDURE — 1100000001 HC PRIVATE ROOM DAILY

## 2024-07-15 PROCEDURE — 85652 RBC SED RATE AUTOMATED: CPT | Performed by: PHYSICIAN ASSISTANT

## 2024-07-15 RX ORDER — ACETAMINOPHEN 325 MG/1
650 TABLET ORAL EVERY 4 HOURS PRN
Status: DISCONTINUED | OUTPATIENT
Start: 2024-07-15 | End: 2024-07-20 | Stop reason: HOSPADM

## 2024-07-15 RX ORDER — ACETAMINOPHEN 325 MG/1
975 TABLET ORAL ONCE
Status: COMPLETED | OUTPATIENT
Start: 2024-07-15 | End: 2024-07-15

## 2024-07-15 RX ORDER — ONDANSETRON HYDROCHLORIDE 2 MG/ML
4 INJECTION, SOLUTION INTRAVENOUS EVERY 6 HOURS PRN
Status: DISCONTINUED | OUTPATIENT
Start: 2024-07-15 | End: 2024-07-20 | Stop reason: HOSPADM

## 2024-07-15 RX ORDER — ACETAMINOPHEN 160 MG/5ML
650 SOLUTION ORAL EVERY 4 HOURS PRN
Status: DISCONTINUED | OUTPATIENT
Start: 2024-07-15 | End: 2024-07-20 | Stop reason: HOSPADM

## 2024-07-15 RX ORDER — SODIUM CHLORIDE 9 MG/ML
100 INJECTION, SOLUTION INTRAVENOUS CONTINUOUS
Status: ACTIVE | OUTPATIENT
Start: 2024-07-15 | End: 2024-07-16

## 2024-07-15 RX ORDER — MORPHINE SULFATE 2 MG/ML
2 INJECTION, SOLUTION INTRAMUSCULAR; INTRAVENOUS EVERY 4 HOURS PRN
Status: DISCONTINUED | OUTPATIENT
Start: 2024-07-15 | End: 2024-07-15

## 2024-07-15 RX ORDER — ONDANSETRON 4 MG/1
4 TABLET, FILM COATED ORAL EVERY 6 HOURS PRN
Status: DISCONTINUED | OUTPATIENT
Start: 2024-07-15 | End: 2024-07-20 | Stop reason: HOSPADM

## 2024-07-15 RX ORDER — MORPHINE SULFATE 4 MG/ML
4 INJECTION, SOLUTION INTRAMUSCULAR; INTRAVENOUS EVERY 4 HOURS PRN
Status: DISCONTINUED | OUTPATIENT
Start: 2024-07-15 | End: 2024-07-15

## 2024-07-15 RX ORDER — SERTRALINE HYDROCHLORIDE 100 MG/1
100 TABLET, FILM COATED ORAL DAILY
Status: DISCONTINUED | OUTPATIENT
Start: 2024-07-15 | End: 2024-07-16

## 2024-07-15 RX ORDER — TRAMADOL HYDROCHLORIDE 50 MG/1
50 TABLET ORAL EVERY 6 HOURS PRN
Status: DISCONTINUED | OUTPATIENT
Start: 2024-07-15 | End: 2024-07-16

## 2024-07-15 RX ORDER — KETOROLAC TROMETHAMINE 30 MG/ML
15 INJECTION, SOLUTION INTRAMUSCULAR; INTRAVENOUS ONCE
Status: COMPLETED | OUTPATIENT
Start: 2024-07-15 | End: 2024-07-15

## 2024-07-15 SDOH — SOCIAL STABILITY: SOCIAL INSECURITY: WERE YOU ABLE TO COMPLETE ALL THE BEHAVIORAL HEALTH SCREENINGS?: YES

## 2024-07-15 SDOH — SOCIAL STABILITY: SOCIAL INSECURITY: ABUSE: ADULT

## 2024-07-15 SDOH — SOCIAL STABILITY: SOCIAL INSECURITY: HAVE YOU HAD ANY THOUGHTS OF HARMING ANYONE ELSE?: NO

## 2024-07-15 SDOH — SOCIAL STABILITY: SOCIAL INSECURITY: DO YOU FEEL UNSAFE GOING BACK TO THE PLACE WHERE YOU ARE LIVING?: NO

## 2024-07-15 SDOH — SOCIAL STABILITY: SOCIAL INSECURITY: HAVE YOU HAD THOUGHTS OF HARMING ANYONE ELSE?: NO

## 2024-07-15 SDOH — SOCIAL STABILITY: SOCIAL INSECURITY: DOES ANYONE TRY TO KEEP YOU FROM HAVING/CONTACTING OTHER FRIENDS OR DOING THINGS OUTSIDE YOUR HOME?: NO

## 2024-07-15 SDOH — SOCIAL STABILITY: SOCIAL INSECURITY: HAS ANYONE EVER THREATENED TO HURT YOUR FAMILY OR YOUR PETS?: NO

## 2024-07-15 SDOH — SOCIAL STABILITY: SOCIAL INSECURITY: DO YOU FEEL ANYONE HAS EXPLOITED OR TAKEN ADVANTAGE OF YOU FINANCIALLY OR OF YOUR PERSONAL PROPERTY?: NO

## 2024-07-15 SDOH — SOCIAL STABILITY: SOCIAL INSECURITY: ARE THERE ANY APPARENT SIGNS OF INJURIES/BEHAVIORS THAT COULD BE RELATED TO ABUSE/NEGLECT?: NO

## 2024-07-15 SDOH — SOCIAL STABILITY: SOCIAL INSECURITY: ARE YOU OR HAVE YOU BEEN THREATENED OR ABUSED PHYSICALLY, EMOTIONALLY, OR SEXUALLY BY ANYONE?: NO

## 2024-07-15 ASSESSMENT — PATIENT HEALTH QUESTIONNAIRE - PHQ9
2. FEELING DOWN, DEPRESSED OR HOPELESS: NOT AT ALL
1. LITTLE INTEREST OR PLEASURE IN DOING THINGS: NOT AT ALL
SUM OF ALL RESPONSES TO PHQ9 QUESTIONS 1 & 2: 0

## 2024-07-15 ASSESSMENT — LIFESTYLE VARIABLES
HOW OFTEN DO YOU HAVE 6 OR MORE DRINKS ON ONE OCCASION: NEVER
TOTAL SCORE: 0
AUDIT-C TOTAL SCORE: 0
SUBSTANCE_ABUSE_PAST_12_MONTHS: NO
AUDIT-C TOTAL SCORE: 0
EVER FELT BAD OR GUILTY ABOUT YOUR DRINKING: NO
HAVE PEOPLE ANNOYED YOU BY CRITICIZING YOUR DRINKING: NO
HOW MANY STANDARD DRINKS CONTAINING ALCOHOL DO YOU HAVE ON A TYPICAL DAY: PATIENT DOES NOT DRINK
PRESCIPTION_ABUSE_PAST_12_MONTHS: NO
EVER HAD A DRINK FIRST THING IN THE MORNING TO STEADY YOUR NERVES TO GET RID OF A HANGOVER: NO
SKIP TO QUESTIONS 9-10: 1
HAVE YOU EVER FELT YOU SHOULD CUT DOWN ON YOUR DRINKING: NO
HOW OFTEN DO YOU HAVE A DRINK CONTAINING ALCOHOL: NEVER

## 2024-07-15 ASSESSMENT — COLUMBIA-SUICIDE SEVERITY RATING SCALE - C-SSRS
6. HAVE YOU EVER DONE ANYTHING, STARTED TO DO ANYTHING, OR PREPARED TO DO ANYTHING TO END YOUR LIFE?: NO
6. HAVE YOU EVER DONE ANYTHING, STARTED TO DO ANYTHING, OR PREPARED TO DO ANYTHING TO END YOUR LIFE?: NO
2. HAVE YOU ACTUALLY HAD ANY THOUGHTS OF KILLING YOURSELF?: NO
1. IN THE PAST MONTH, HAVE YOU WISHED YOU WERE DEAD OR WISHED YOU COULD GO TO SLEEP AND NOT WAKE UP?: NO
1. IN THE PAST MONTH, HAVE YOU WISHED YOU WERE DEAD OR WISHED YOU COULD GO TO SLEEP AND NOT WAKE UP?: NO
2. HAVE YOU ACTUALLY HAD ANY THOUGHTS OF KILLING YOURSELF?: NO

## 2024-07-15 ASSESSMENT — ACTIVITIES OF DAILY LIVING (ADL)
FEEDING YOURSELF: INDEPENDENT
ADEQUATE_TO_COMPLETE_ADL: YES
TOILETING: INDEPENDENT
BATHING: INDEPENDENT
LACK_OF_TRANSPORTATION: NO
DRESSING YOURSELF: INDEPENDENT
JUDGMENT_ADEQUATE_SAFELY_COMPLETE_DAILY_ACTIVITIES: YES
PATIENT'S MEMORY ADEQUATE TO SAFELY COMPLETE DAILY ACTIVITIES?: YES
WALKS IN HOME: INDEPENDENT
HEARING - RIGHT EAR: FUNCTIONAL
GROOMING: INDEPENDENT
HEARING - LEFT EAR: FUNCTIONAL

## 2024-07-15 ASSESSMENT — COGNITIVE AND FUNCTIONAL STATUS - GENERAL
PATIENT BASELINE BEDBOUND: NO
MOBILITY SCORE: 24
DAILY ACTIVITIY SCORE: 24

## 2024-07-15 ASSESSMENT — PAIN - FUNCTIONAL ASSESSMENT
PAIN_FUNCTIONAL_ASSESSMENT: 0-10

## 2024-07-15 ASSESSMENT — PAIN SCALES - PAIN ASSESSMENT IN ADVANCED DEMENTIA (PAINAD): TOTALSCORE: MEDICATION (SEE MAR)

## 2024-07-15 ASSESSMENT — PAIN SCALES - GENERAL
PAINLEVEL_OUTOF10: 6
PAINLEVEL_OUTOF10: 0 - NO PAIN
PAINLEVEL_OUTOF10: 7
PAINLEVEL_OUTOF10: 5 - MODERATE PAIN

## 2024-07-15 NOTE — CONSULTS
Vancomycin Dosing by Pharmacy- INITIAL    Mac Perez is a 28 y.o. year old male who Pharmacy has been consulted for vancomycin dosing for cellulitis, skin and soft tissue. Based on the patient's indication and renal status this patient will be dosed based on a goal AUC of 400-600.     Renal function is currently declining.    Visit Vitals  /55 (BP Location: Left arm, Patient Position: Sitting)   Pulse 80   Temp (!) 38.1 °C (100.6 °F) (Temporal) Comment: ANAYELI Álvarez notified   Resp (!) 28 Comment: ANAYELI Álvarez notified        Lab Results   Component Value Date    CREATININE 1.23 07/15/2024    CREATININE 1.04 2024    CREATININE 1.02 2023    CREATININE 1.18 10/27/2022        Patient weight is as follows:   Vitals:    07/15/24 1127   Weight: 86.2 kg (190 lb)       Cultures:  No results found for the encounter in last 14 days.        No intake/output data recorded.  I/O during current shift:  No intake/output data recorded.    Temp (24hrs), Av.9 °C (98.5 °F), Min:35.8 °C (96.4 °F), Max:38.1 °C (100.6 °F)         Assessment/Plan     Patient has already been given a loading dose of 2000 mg on 7/15/24 at 1329.  Will initiate vancomycin maintenance,  1250 mg every 12 hours.    This dosing regimen is predicted by [employer] to result in the following pharmacokinetic parameters:  Regimen: 1250 mg IV every 12 hours.  Start time: 01:29 on 2024  Exposure target: AUC24 (range)400-600 mg/L.hr   AUC24,ss: 589 mg/L.hr  Probability of AUC24 > 400: 86 %  Ctrough,ss: 18.7 mg/L  Probability of Ctrough,ss > 20: 44 %  Probability of nephrotoxicity (Lodise BELÉN ): 15 %      Follow-up level will be ordered on  at Mid-AM unless clinically indicated sooner.  Will continue to monitor renal function daily while on vancomycin and order serum creatinine at least every 48 hours if not already ordered.  Follow for continued vancomycin needs, clinical response, and signs/symptoms of toxicity.       Melany Lowe,  PharmD

## 2024-07-15 NOTE — H&P
History Of Present Illness  Mac Perez is a 28 y.o. male with no significant past medical history presents to the [facility] ED with complaints of worsening pain, swelling, and redness to his right lower extremity.  Reports approximately 1.5 weeks ago, he had poison ivy on the right lower leg which does seem to be resolving, notes small healing scabs on the right anterior ankle.  2-3 days ago he noted increasing redness, pain, chills, and sweats.  Says him and his wife were having a family party at their house Saturday when they noticed he started limping around and developed diaphoresis and fevers.  He was evaluated in the ED 2 days ago, at which time he was found to have edema, mild erythema, and lymphangitic streaking of the right lower extremity.  Ultrasound negative for DVT at that time.  Patient discharged on Keflex and Bactrim.  Patient was thought to have cellulitis secondary to wounds from poison ivy.  Patient notes worsening of symptoms since his ED visit.  Denies any drainage.  Denies bleeding.  States his right lower extremity is very painful, more swollen, and warm to touch.  Also states they noticed increased redness to his right anterior thigh that has progressively worsened.  States the erythematous area has progressed to his upper thigh, wife had made an outline of the red area last night and so far today it has spread quite a bit.  Admits to difficulty with ambulation secondary to the pain.  Admits to mild headaches.  Admits to fevers, chills, and sweats.  Denies dizziness, shortness of breath, chest pains, abdominal pain, nausea or vomiting, difficulty with eating, changes in urination/bowel movements, or weakness.  Admits to vaping and occasional alcohol use.  Denies drug use.  Does not note any significant medical history, cardiac issues, or lung issues.    ED course: On arrival to the ED, patient was afebrile, tachycardic with heart rate 104, otherwise hemodynamically stable with SpO2 100% on  room air.  Glucose 117, sodium 133.  AST 53.  Lactate 1.0.  WBC 9.4 with left shift (WBC 12.9 on 7/13).  Urinalysis from 7/14 unremarkable.  Ultrasound of right lower extremity negative for DVT on 7/14.  X-ray of right femur and right tibia/fibula unremarkable.  Patient given Tylenol, Toradol, Zosyn, NS bolus, and vancomycin in the ED.    Admitting provider is Dr. Jackson     Past Medical History  Past Medical History:   Diagnosis Date    Deficiency of other specified B group vitamins 10/27/2022    Vitamin B12 deficiency     Surgical History  No past surgical history on file.     Social History  Admits to vaping daily.  Admits to occasional alcohol use.  Denies drug use.  Denies smoking cigarettes.    Family History  Family History   Problem Relation Name Age of Onset    Hypertension Mother      Pancreatic cancer Father      Diabetes Brother       Allergies  Doxycycline and Methylphenidate    Review of Systems  10 point review system negative except as noted above in HPI    Physical Exam  Constitutional:       Appearance: Normal appearance.      Comments: Wife at bedside   HENT:      Head: Normocephalic and atraumatic.      Mouth/Throat:      Mouth: Mucous membranes are moist.      Pharynx: Oropharynx is clear.   Eyes:      Extraocular Movements: Extraocular movements intact.      Conjunctiva/sclera: Conjunctivae normal.      Pupils: Pupils are equal, round, and reactive to light.   Cardiovascular:      Rate and Rhythm: Normal rate and regular rhythm.      Pulses: Normal pulses.      Heart sounds: Normal heart sounds.   Pulmonary:      Effort: Pulmonary effort is normal. No respiratory distress.      Breath sounds: Normal breath sounds. No wheezing.   Abdominal:      General: Bowel sounds are normal. There is no distension.      Palpations: Abdomen is soft.      Tenderness: There is no abdominal tenderness.   Musculoskeletal:         General: Swelling and tenderness present. Normal range of motion.      Comments: Full  "range of motion intact.  Distal pulses intact.  Neurovascularly intact.   Skin:     General: Skin is warm and dry.      Findings: Erythema present.      Comments: Cellulitic changes, erythema, warmth, and tenderness palpation noted to right lower extremity below the knee and also to right anterior thigh.  Worsening erythematous changes noted to right thigh compared to outline of area that patient's wife made on his leg last night.  Healing wounds noted to anterior right ankle, from prior poison ivy approximately 1.5 weeks ago.   Neurological:      General: No focal deficit present.      Mental Status: He is alert and oriented to person, place, and time.   Psychiatric:         Mood and Affect: Mood normal.         Behavior: Behavior normal.       Last Recorded Vitals  Blood pressure 143/74, pulse 79, temperature 35.8 °C (96.4 °F), temperature source Temporal, resp. rate 18, height 1.702 m (5' 7\"), weight 86.2 kg (190 lb), SpO2 100%.    Relevant Results  Results for orders placed or performed during the hospital encounter of 07/15/24 (from the past 24 hour(s))   CBC and Auto Differential   Result Value Ref Range    WBC 9.4 4.4 - 11.3 x10*3/uL    nRBC 0.0 0.0 - 0.0 /100 WBCs    RBC 4.47 (L) 4.50 - 5.90 x10*6/uL    Hemoglobin 14.4 13.5 - 17.5 g/dL    Hematocrit 40.8 (L) 41.0 - 52.0 %    MCV 91 80 - 100 fL    MCH 32.2 26.0 - 34.0 pg    MCHC 35.3 32.0 - 36.0 g/dL    RDW 12.9 11.5 - 14.5 %    Platelets 150 150 - 450 x10*3/uL    Immature Granulocytes %, Automated 1.3 (H) 0.0 - 0.9 %    Immature Granulocytes Absolute, Automated 0.12 0.00 - 0.70 x10*3/uL   Comprehensive metabolic panel   Result Value Ref Range    Glucose 117 (H) 74 - 99 mg/dL    Sodium 133 (L) 136 - 145 mmol/L    Potassium 3.8 3.5 - 5.3 mmol/L    Chloride 100 98 - 107 mmol/L    Bicarbonate 27 21 - 32 mmol/L    Anion Gap 10 10 - 20 mmol/L    Urea Nitrogen 11 6 - 23 mg/dL    Creatinine 1.23 0.50 - 1.30 mg/dL    eGFR 82 >60 mL/min/1.73m*2    Calcium 9.1 8.6 - " 10.3 mg/dL    Albumin 3.7 3.4 - 5.0 g/dL    Alkaline Phosphatase 67 33 - 120 U/L    Total Protein 6.7 6.4 - 8.2 g/dL    AST 53 (H) 9 - 39 U/L    Bilirubin, Total 0.5 0.0 - 1.2 mg/dL    ALT 46 10 - 52 U/L   Lactate   Result Value Ref Range    Lactate 1.0 0.4 - 2.0 mmol/L   Manual Differential   Result Value Ref Range    Neutrophils %, Manual 43.0 40.0 - 80.0 %    Bands %, Manual 44.0 0.0 - 5.0 %    Lymphocytes %, Manual 3.0 13.0 - 44.0 %    Monocytes %, Manual 2.0 2.0 - 10.0 %    Eosinophils %, Manual 3.0 0.0 - 6.0 %    Basophils %, Manual 4.0 0.0 - 2.0 %    Metamyelocytes %, Manual 1.0 0.0 - 0.0 %    Seg Neutrophils Absolute, Manual 4.04 1.20 - 7.00 x10*3/uL    Bands Absolute, Manual 4.14 (H) 0.00 - 0.70 x10*3/uL    Lymphocytes Absolute, Manual 0.28 (L) 1.20 - 4.80 x10*3/uL    Monocytes Absolute, Manual 0.19 0.10 - 1.00 x10*3/uL    Eosinophils Absolute, Manual 0.28 0.00 - 0.70 x10*3/uL    Basophils Absolute, Manual 0.38 (H) 0.00 - 0.10 x10*3/uL    Metamyelocytes Absolute, Manual 0.09 0.00 - 0.00 x10*3/uL    Total Cells Counted 100     Neutrophils Absolute, Manual 8.18 (H) 1.20 - 7.70 x10*3/uL    RBC Morphology No significant RBC morphology present    C-reactive protein   Result Value Ref Range    C-Reactive Protein 29.92 (H) <1.00 mg/dL   Sedimentation rate, automated   Result Value Ref Range    Sedimentation Rate 8 0 - 15 mm/h       XR femur right 2+ views    Result Date: 7/15/2024  Interpreted By:  Cynthia Keita, STUDY: XR FEMUR RIGHT 2+ VIEWS; XR TIBIA FIBULA RIGHT 2 VIEWS;  7/15/2024 12:57 pm   INDICATION: Signs/Symptoms:edema erythema cellulitis.   COMPARISON: None.   ACCESSION NUMBER(S): [id number]; [id number]   ORDERING CLINICIAN: BHARATHI MALDONADO   FINDINGS: No acute fracture or osseous destruction. Imaged hip, knee and ankle joints are grossly unremarkable. No knee joint effusion. Diffuse subcutaneous edema more pronounced in the lower leg.       No acute osseous abnormality.   MACRO None   Signed by:  Cynthia Keita 7/15/2024 1:17 PM Dictation workstation:   [id number]    XR tibia fibula right 2 views    Result Date: 7/15/2024  Interpreted By:  Cynthia Keita, STUDY: XR FEMUR RIGHT 2+ VIEWS; XR TIBIA FIBULA RIGHT 2 VIEWS;  7/15/2024 12:57 pm   INDICATION: Signs/Symptoms:edema erythema cellulitis.   COMPARISON: None.   ACCESSION NUMBER(S): [id number]; [id number]   ORDERING CLINICIAN: BHARATHI MALDONADO   FINDINGS: No acute fracture or osseous destruction. Imaged hip, knee and ankle joints are grossly unremarkable. No knee joint effusion. Diffuse subcutaneous edema more pronounced in the lower leg.       No acute osseous abnormality.   MACRO None   Signed by: Cynthia Keita 7/15/2024 1:17 PM Dictation workstation:   [id number]    Vascular US lower extremity venous duplex right    Result Date: 7/14/2024  Interpreted By:  Lisa Tristan, STUDY: VAS US LOWER EXTREMITY VENOUS DUPLEX RIGHT;  7/14/2024 12:00 am   INDICATION: Signs/Symptoms:swelling rle.   COMPARISON: None.   ACCESSION NUMBER(S): [id number]   ORDERING CLINICIAN: DONNA STANLEY   TECHNIQUE: Grayscale, color and spectral Doppler sonographic images of the right lower extremity deep venous system. The left common femoral vein was imaged for comparison.   FINDINGS: There is normal compressibility of the right common femoral vein, saphenous femoral junction, femoral vein and popliteal vein. The posterior tibial and peroneal veins demonstrate normal color flow and compressibility. There is normal spontaneous and phasic variation throughout the leg by spectral doppler.   The left common femoral vein is patent.   OTHER FINDINGS: None.       No sonographic evidence of acute DVT in the right lower extremity.       MACRO: None   Signed by: Lisa Tristan 7/14/2024 12:52 AM Dictation workstation:   [id number]    XR chest 1 view    Result Date: 7/13/2024  Interpreted By:  Lisa Tristan, STUDY: XR CHEST 1 VIEW;  7/13/2024 10:42 pm   INDICATION:  Signs/Symptoms:sob.   COMPARISON: None.   ACCESSION NUMBER(S): [id number]   ORDERING CLINICIAN: DONNA STANLEY   FINDINGS:     CARDIOMEDIASTINAL SILHOUETTE: Cardiomediastinal silhouette is normal in size and configuration.   LUNGS: No pulmonary consolidation, pleural effusion or pneumothorax.   ABDOMEN: No remarkable upper abdominal findings.   BONES: No acute osseous abnormality.       No acute cardiopulmonary process.   MACRO: None   Signed by: Lisa Tristan 7/13/2024 11:19 PM Dictation workstation:   [id number]      Assessment/Plan   Principal Problem:    Cellulitis of right lower extremity    Mac Perez is a 28 y.o. male presents to the [facility] ED with complaints of worsening pain, swelling, and redness to his right lower extremity.  Reports approximately 1.5 weeks ago, he had poison ivy on the right lower leg which does seem to be resolving, notes small healing scabs on the right anterior ankle.  2-3 days ago he noted increasing redness, pain, chills, and sweats. He was evaluated in the ED 2 days ago, at which time he was found to have edema, mild erythema, and lymphangitic streaking of the right lower extremity.  Ultrasound negative for DVT at that time.  Patient discharged on Keflex and Bactrim. Patient notes worsening of symptoms since his ED visit. States his right lower extremity is very painful, more swollen, and warm to touch.  Also states they noticed increased redness to his right anterior thigh that has progressively worsened.     #Cellulitis right lower extremity  #Elevated inflammatory markers  #Tachycardia, improved  #Erythema, pain, warmth, swelling to right lower extremity  #Hyponatremia  Admit as inpatient  Leukocytosis improving, was 12.9 on 7/13 and 9.4 today, however still mild bandemia  New blood cultures pending (blood cultures from 7/13 showing no growth at 1 day)  Wound culture ordered if any drainage noted from RLE  CRP, ESR added onto lab work  IV Zosyn every 6 hours, IV  vancomycin  IV fluids x 24 hours  Pain medicine, Zofran as needed  Regular diet  Every 8 vitals  CBC, CMP in a.m.    #Elevated AST (AST 53 today, AST 18 on 7/13)  Monitor with a.m. labs    Continue home medications as appropriate    #DVT prophylaxis  Ambulation       I spent 45 minutes in the professional and overall care of this patient.    Sanjay Stewart PA-C

## 2024-07-15 NOTE — CARE PLAN
Problem: Pain  Goal: Takes deep breaths with improved pain control throughout the shift  Outcome: Progressing  Goal: Turns in bed with improved pain control throughout the shift  Outcome: Progressing  Goal: Walks with improved pain control throughout the shift  Outcome: Progressing  Goal: Performs ADL's with improved pain control throughout shift  Outcome: Progressing  Goal: Participates in PT with improved pain control throughout the shift  Outcome: Progressing  Goal: Free from opioid side effects throughout the shift  Outcome: Progressing  Goal: Free from acute confusion related to pain meds throughout the shift  Outcome: Progressing     Problem: Skin  Goal: Decreased wound size/increased tissue granulation at next dressing change  Outcome: Progressing  Goal: Participates in plan/prevention/treatment measures  Outcome: Progressing  Goal: Prevent/manage excess moisture  Outcome: Progressing  Goal: Prevent/minimize sheer/friction injuries  Outcome: Progressing  Goal: Promote/optimize nutrition  Outcome: Progressing  Goal: Promote skin healing  Outcome: Progressing   The patient's goals for the shift include      The clinical goals for the shift include pain management

## 2024-07-15 NOTE — ED TRIAGE NOTES
Patient arrives from home with complaints of swelling and redness to right lower extremity.  Patient was just here on Saturday for cellulitis and discharged home on keflex and some other antibiotic he is not sure of.  Patient states the redness is spreading and the swelling has intensified.

## 2024-07-15 NOTE — ED PROVIDER NOTES
HPI   Chief Complaint   Patient presents with    Cellulitis     Patient arrives from home with complaints of swelling and redness to right lower extremity.  Patient was just here on Saturday for cellulitis and discharged home on keflex and some other antibiotic he is not sure of.  Patient states the redness is spreading and the swelling has intensified.       HPI  HPI: This is 28 y.o. male who presents to the ER complaining of worsening pain, swelling, and redness of the right lower extremity.  Patient states that about 1.5 weeks ago, he had poison ivy on the right lower leg which seems to be resolving, small residual healing scabs on the right anterior ankle.  2-3 days ago, he began experiencing increasing redness, pain, chills, and sweats throughout the right lower extremity.  He was evaluated in the ED 2 days ago, at which time he was found to have edema, mild erythema, and lymphangitic streaking of the right lower extremity.  He had labs which were remarkable for a leukocytosis of 12.9, ultrasound negative for DVT, and was ultimately discharged on Keflex and Bactrim.  Cellulitis thought to be secondary to wounds from prior poison ivy.  He states that since then, the swelling and redness of the right lower extremity has worsened.  He reports that he initially had redness and swelling of the right lower leg, which has been persistent and worsened, and initially had some pain over the anterior right upper thigh.  He states that he has now developed a rash and redness over the anterior right upper thigh where he initially just had pain.  He is able to ambulate unassisted, has some discomfort with weightbearing.  States the leg feels tight.  Minimal itching.  He has not had any bleeding or drainage.  He reports occasional chills and sweats, but no fevers.  No numbness, tingling, or weakness of the lower extremities.  No chest pain or shortness of breath.  No abdominal pain, vomiting, or diarrhea.  Denies any history  of diabetes or immunocompromised status.  No other complaints or symptoms voiced.    12-point ROS was completed and is otherwise negative unless specified in HPI.     PMH: ADHD, GERD, depression, anxiety  Social History: +vaping, no tobacco use, occasional EtOH, no drugs  Family History: Noncontributory    Physical Exam:  General: Vital signs stable, Pt is alert, no acute distress  Eyes: Conjunctiva normal, EOMs intact  HENMT: Normocephalic, atraumatic.  Moist mucous membranes.   Resp: Respiratory effort is normal, no retractions, no stridor.   CV: Heart is regular rate and rhythm.   Skin: No evidence of trauma, skin is warm and dry. See below.  Neuro: Steady gait, no motor or sensory changes.  Skel: full range of motion of upper and lower extremities. No midline tenderness over the cervical thoracic or lumbar spine.  RLE: +edema, erythema, warmth, and lymphangitic streaking over the right leg.  Edema and erythema present throughout the lower leg, lymphangitic streaking over the medial anterior thigh.  There is erythema, warmth, and petechiae over the right anterior upper thigh, see below photo.  Mild tenderness over areas of rash.  Able to fully range all joints of the RLE without significant pain, no evidence of intra-articular infection.  No soft tissue crepitus, no subcutaneous emphysema.  If you healing scabs from reported prior poison ivy present on the anterior right ankle.  Skin otherwise intact, no areas of fluctuance.  The patella tracks normally. Achilles is intact. There is no warmth or erythema. Compartments are soft to palpation. Is neurovascularly intact distally, 2+ DP pulses, cap refill <2 sec, warm and well perfused, sensation intact and equal throughout the BLE.             Yanira Coma Scale Score: 15                     Patient History   Past Medical History:   Diagnosis Date    Deficiency of other specified B group vitamins 10/27/2022    Vitamin B12 deficiency     No past surgical history on  file.  Family History   Problem Relation Name Age of Onset    Hypertension Mother      Pancreatic cancer Father      Diabetes Brother       Social History     Tobacco Use    Smoking status: Never     Passive exposure: Never    Smokeless tobacco: Never   Vaping Use    Vaping status: Every Day    Substances: Nicotine   Substance Use Topics    Alcohol use: Yes     Alcohol/week: 5.0 standard drinks of alcohol     Types: 5 Standard drinks or equivalent per week     Comment: occ    Drug use: Never       Physical Exam   ED Triage Vitals [07/15/24 1127]   Temperature Heart Rate Respirations BP   35.8 °C (96.4 °F) (!) 104 18 143/74      SpO2 Temp Source Heart Rate Source Patient Position   -- Temporal Monitor --      BP Location FiO2 (%)     Right arm --       Physical Exam    ED Course & MDM   Diagnoses as of 07/15/24 1418   Cellulitis of right lower extremity       Medical Decision Making  ED course / MDM     Summary:  Patient presented with worsening right lower extremity cellulitis.  Mildly tachycardic in triage at 104, normalized on repeat vitals, vital signs otherwise stable.  Patient is overall well-appearing, nontoxic, ambulates unassisted, no acute distress.  On exam, there is significant edema and erythema throughout the right lower extremity, no significant over the proximal anterior thigh, and the anterior lower leg.  No soft tissue crepitus.  Able to range all joints of the extremity.  Is neurovascularly intact.  IV established, labs drawn.  Labs show no leukocytosis, however does show bandemia.  Normal hemoglobin.  Minor electrolyte abnormalities, normal kidney function, mildly elevated AST 53, normal ALT and bilirubin.  Normal lactate.  Ultrasound was completed 2 days ago, will defer repeating ultrasound at this time.  X-rays of the right femur and tib-fib show no acute osseous abnormality, diffuse subcutaneous edema.  Blood cultures were drawn, and patient was given a dose of empiric IV vancomycin and Zosyn  in the ED, as well as IV fluids, and a dose of Toradol and Tylenol.  Patient case discussed with ED attending Dr. Elias, who also saw and evaluated the patient. Results and differential were discussed in detail with the patient.  He has a significant cellulitis of the right lower extremity, with failure of outpatient oral antibiotics.  Recommending admission for IV antibiotics.  Patient is in agreement with this plan.  Accepted to the medicine service.    Impression:  1. See diagnosis     Disposition: Admission    Patient seen and discussed with Dr. Elias    This note has been transcribed using voice recognition and may contain grammatical errors, misplaced words, incorrect words, incorrect phrases or other errors.   Procedure  Procedures     Felicity Clayton PA-C  07/15/24 0598

## 2024-07-15 NOTE — H&P
History Of Present Illness//patient seen and examined by me.  Patient's wife is present in the room.  She is a nurse.  Mac Perez is a 28 y.o. male with no significant past medical history presents to the [facility] ED with complaints of worsening pain, swelling, and redness to his right lower extremity.  Reports approximately 1.5 weeks ago, he had poison ivy on the right lower leg which does seem to be resolving, notes small healing scabs on the right anterior ankle.  2-3 days ago he noted increasing redness, pain, chills, and sweats.  Says him and his wife were having a family party at their house Saturday when they noticed he started limping around and developed diaphoresis and fevers.  He was evaluated in the ED 2 days ago, at which time he was found to have edema, mild erythema, and lymphangitic streaking of the right lower extremity.  Ultrasound negative for DVT at that time.  Patient discharged on Keflex and Bactrim.  Patient was thought to have cellulitis secondary to wounds from poison ivy.  Patient notes worsening of symptoms since his ED visit.  Denies any drainage.  Denies bleeding.  States his right lower extremity is very painful, more swollen, and warm to touch.  Also states they noticed increased redness to his right anterior thigh that has progressively worsened.  States the erythematous area has progressed to his upper thigh, wife had made an outline of the red area last night and so far today it has spread quite a bit.  Admits to difficulty with ambulation secondary to the pain.  Admits to mild headaches.  Admits to fevers, chills, and sweats.  Denies dizziness, shortness of breath, chest pains, abdominal pain, nausea or vomiting, difficulty with eating, changes in urination/bowel movements, or weakness.  Admits to vaping and occasional alcohol use.  Denies drug use.  Does not note any significant medical history, cardiac issues, or lung issues.    ED course: On arrival to the ED, patient was  afebrile, tachycardic with heart rate 104, otherwise hemodynamically stable with SpO2 100% on room air.  Glucose 117, sodium 133.  AST 53.  Lactate 1.0.  WBC 9.4 with left shift (WBC 12.9 on 7/13).  Urinalysis from 7/14 unremarkable.  Ultrasound of right lower extremity negative for DVT on 7/14.  X-ray of right femur and right tibia/fibula was negative for any fractures but showed subcutaneous soft tissue edema.  Patient given Tylenol, Toradol, Zosyn, NS bolus, and vancomycin in the ED.    Patient has been admitted under my service for further treatment and management of this cellulitis and pain.     Past Medical History  Past Medical History:   Diagnosis Date    Deficiency of other specified B group vitamins 10/27/2022    Vitamin B12 deficiency   /History of anxiety/remains on Zoloft  Surgical History  No past surgical history on file.     Social History  Admits to vaping daily.  Admits to occasional alcohol use.  Denies drug use.  Denies smoking cigarettes.    Family History  Family History   Problem Relation Name Age of Onset    Hypertension Mother      Pancreatic cancer Father      Diabetes Brother       Allergies  Doxycycline and Methylphenidate    Review of Systems  10 point review system negative except as noted above in HPI    Physical Exam  Constitutional:       Appearance: Normal appearance.   HENT:      Head: Normocephalic and atraumatic.      Mouth/Throat:      Mouth: Mucous membranes are moist.      Pharynx: Oropharynx is clear.   Eyes:      Extraocular Movements: Extraocular movements intact.      Conjunctiva/sclera: Conjunctivae normal.      Pupils: Pupils are equal, round, and reactive to light.   Cardiovascular:      Rate and Rhythm: Normal rate and regular rhythm.      Pulses: Normal pulses.      Heart sounds: Normal heart sounds.   Pulmonary:      Effort: Pulmonary effort is normal. No respiratory distress.      Breath sounds: Normal breath sounds. No wheezing.   Abdominal:      General: Bowel  "sounds are normal. There is no distension.      Palpations: Abdomen is soft.      Tenderness: There is no abdominal tenderness.   Musculoskeletal:         General: Swelling and tenderness present. Normal range of motion.      Comments: Full range of motion intact.  Distal pulses intact.  Neurovascularly intact.   Skin:     General: Skin is warm and dry.      Findings: Erythema present.      Comments: Cellulitic changes, erythema, warmth, and tenderness palpation noted to right lower extremity below the knee and also to right anterior thigh.  Worsening erythematous changes noted to right thigh compared to outline of area that patient's wife made on his leg last night.  Healing wounds noted to anterior right ankle, from prior poison ivy approximately 1.5 weeks ago.   Neurological:      General: No focal deficit present.      Mental Status: He is alert and oriented to person, place, and time.   Psychiatric:         Mood and Affect: Mood normal.         Behavior: Behavior normal.       Last Recorded Vitals  Blood pressure 113/55, pulse 80, temperature (!) 38.1 °C (100.6 °F), temperature source Temporal, resp. rate (!) 28, height 1.702 m (5' 7\"), weight 86.2 kg (190 lb), SpO2 99%.    Relevant Results  Results for orders placed or performed during the hospital encounter of 07/15/24 (from the past 24 hour(s))   CBC and Auto Differential   Result Value Ref Range    WBC 9.4 4.4 - 11.3 x10*3/uL    nRBC 0.0 0.0 - 0.0 /100 WBCs    RBC 4.47 (L) 4.50 - 5.90 x10*6/uL    Hemoglobin 14.4 13.5 - 17.5 g/dL    Hematocrit 40.8 (L) 41.0 - 52.0 %    MCV 91 80 - 100 fL    MCH 32.2 26.0 - 34.0 pg    MCHC 35.3 32.0 - 36.0 g/dL    RDW 12.9 11.5 - 14.5 %    Platelets 150 150 - 450 x10*3/uL    Immature Granulocytes %, Automated 1.3 (H) 0.0 - 0.9 %    Immature Granulocytes Absolute, Automated 0.12 0.00 - 0.70 x10*3/uL   Comprehensive metabolic panel   Result Value Ref Range    Glucose 117 (H) 74 - 99 mg/dL    Sodium 133 (L) 136 - 145 mmol/L    " Potassium 3.8 3.5 - 5.3 mmol/L    Chloride 100 98 - 107 mmol/L    Bicarbonate 27 21 - 32 mmol/L    Anion Gap 10 10 - 20 mmol/L    Urea Nitrogen 11 6 - 23 mg/dL    Creatinine 1.23 0.50 - 1.30 mg/dL    eGFR 82 >60 mL/min/1.73m*2    Calcium 9.1 8.6 - 10.3 mg/dL    Albumin 3.7 3.4 - 5.0 g/dL    Alkaline Phosphatase 67 33 - 120 U/L    Total Protein 6.7 6.4 - 8.2 g/dL    AST 53 (H) 9 - 39 U/L    Bilirubin, Total 0.5 0.0 - 1.2 mg/dL    ALT 46 10 - 52 U/L   Lactate   Result Value Ref Range    Lactate 1.0 0.4 - 2.0 mmol/L   Manual Differential   Result Value Ref Range    Neutrophils %, Manual 43.0 40.0 - 80.0 %    Bands %, Manual 44.0 0.0 - 5.0 %    Lymphocytes %, Manual 3.0 13.0 - 44.0 %    Monocytes %, Manual 2.0 2.0 - 10.0 %    Eosinophils %, Manual 3.0 0.0 - 6.0 %    Basophils %, Manual 4.0 0.0 - 2.0 %    Metamyelocytes %, Manual 1.0 0.0 - 0.0 %    Seg Neutrophils Absolute, Manual 4.04 1.20 - 7.00 x10*3/uL    Bands Absolute, Manual 4.14 (H) 0.00 - 0.70 x10*3/uL    Lymphocytes Absolute, Manual 0.28 (L) 1.20 - 4.80 x10*3/uL    Monocytes Absolute, Manual 0.19 0.10 - 1.00 x10*3/uL    Eosinophils Absolute, Manual 0.28 0.00 - 0.70 x10*3/uL    Basophils Absolute, Manual 0.38 (H) 0.00 - 0.10 x10*3/uL    Metamyelocytes Absolute, Manual 0.09 0.00 - 0.00 x10*3/uL    Total Cells Counted 100     Neutrophils Absolute, Manual 8.18 (H) 1.20 - 7.70 x10*3/uL    RBC Morphology No significant RBC morphology present    C-reactive protein   Result Value Ref Range    C-Reactive Protein 29.92 (H) <1.00 mg/dL   Sedimentation rate, automated   Result Value Ref Range    Sedimentation Rate 8 0 - 15 mm/h       XR femur right 2+ views    Result Date: 7/15/2024  Interpreted By:  Cynthia Keita, STUDY: XR FEMUR RIGHT 2+ VIEWS; XR TIBIA FIBULA RIGHT 2 VIEWS;  7/15/2024 12:57 pm   INDICATION: Signs/Symptoms:edema erythema cellulitis.   COMPARISON: None.   ACCESSION NUMBER(S): [id number]; [id number]   ORDERING CLINICIAN: BHARATHI MALDONADO   FINDINGS: No  acute fracture or osseous destruction. Imaged hip, knee and ankle joints are grossly unremarkable. No knee joint effusion. Diffuse subcutaneous edema more pronounced in the lower leg.       No acute osseous abnormality.   MACRO None   Signed by: Cynthia Keita 7/15/2024 1:17 PM Dictation workstation:   [id number]    XR tibia fibula right 2 views    Result Date: 7/15/2024  Interpreted By:  Cynthia Keita, STUDY: XR FEMUR RIGHT 2+ VIEWS; XR TIBIA FIBULA RIGHT 2 VIEWS;  7/15/2024 12:57 pm   INDICATION: Signs/Symptoms:edema erythema cellulitis.   COMPARISON: None.   ACCESSION NUMBER(S): [id number]; [id number]   ORDERING CLINICIAN: BHARATHI MALDONADO   FINDINGS: No acute fracture or osseous destruction. Imaged hip, knee and ankle joints are grossly unremarkable. No knee joint effusion. Diffuse subcutaneous edema more pronounced in the lower leg.       No acute osseous abnormality.   MACRO None   Signed by: Cynthia Keita 7/15/2024 1:17 PM Dictation workstation:   [id number]    Vascular US lower extremity venous duplex right    Result Date: 7/14/2024  Interpreted By:  Lisa Tristan, STUDY: VASC US LOWER EXTREMITY VENOUS DUPLEX RIGHT;  7/14/2024 12:00 am   INDICATION: Signs/Symptoms:swelling rle.   COMPARISON: None.   ACCESSION NUMBER(S): [id number]   ORDERING CLINICIAN: DONNA STANLEY   TECHNIQUE: Grayscale, color and spectral Doppler sonographic images of the right lower extremity deep venous system. The left common femoral vein was imaged for comparison.   FINDINGS: There is normal compressibility of the right common femoral vein, saphenous femoral junction, femoral vein and popliteal vein. The posterior tibial and peroneal veins demonstrate normal color flow and compressibility. There is normal spontaneous and phasic variation throughout the leg by spectral doppler.   The left common femoral vein is patent.   OTHER FINDINGS: None.       No sonographic evidence of acute DVT in the right lower extremity.        MACRO: None   Signed by: Lisa Tristan 7/14/2024 12:52 AM Dictation workstation:   [id number]    XR chest 1 view    Result Date: 7/13/2024  Interpreted By:  Lisa Tristan, STUDY: XR CHEST 1 VIEW;  7/13/2024 10:42 pm   INDICATION: Signs/Symptoms:sob.   COMPARISON: None.   ACCESSION NUMBER(S): [id number]   ORDERING CLINICIAN: DONNA STANLEY   FINDINGS:     CARDIOMEDIASTINAL SILHOUETTE: Cardiomediastinal silhouette is normal in size and configuration.   LUNGS: No pulmonary consolidation, pleural effusion or pneumothorax.   ABDOMEN: No remarkable upper abdominal findings.   BONES: No acute osseous abnormality.       No acute cardiopulmonary process.   MACRO: None   Signed by: Lisa Tristan 7/13/2024 11:19 PM Dictation workstation:   [id number]      Assessment/Plan   Principal Problem:    Cellulitis of right lower extremity    Mac Perez is a 28 y.o. male presents to the [facility] ED with complaints of worsening pain, swelling, and redness to his right lower extremity.  Reports approximately 1.5 weeks ago, he had poison ivy on the right lower leg which does seem to be resolving, notes small healing scabs on the right anterior ankle.  2-3 days ago he noted increasing redness, pain, chills, and sweats. He was evaluated in the ED 2 days ago, at which time he was found to have edema, mild erythema, and lymphangitic streaking of the right lower extremity.  Ultrasound negative for DVT at that time.  Patient discharged on Keflex and Bactrim. Patient notes worsening of symptoms since his ED visit. States his right lower extremity is very painful, more swollen, and warm to touch.  Also states they noticed increased redness to his right anterior thigh that has progressively worsened.     # Acute cellulitis right lower extremity/subcutaneous edema seen on x-rays done in the ED/not improving on oral antibiotics as an outpatient/history of poison ivy and erythema and cellulitis and swelling developed thereafter/ultrasound of  the right leg was -2 days ago for acute DVT.  #Elevated inflammatory markers/elevated CRP  # Sinus tachycardia, improved  #Erythema, pain, warmth, swelling to right lower extremity/pain control/patient stated that morphine was making him sick to the stomach and he will be switched to oral tramadol 50 mg p.o. every 6 hours.  #Hyponatremia  Admit as inpatient  Leukocytosis improving, was 12.9 on 7/13 and 9.4 today, however still mild bandemia  New blood cultures pending (blood cultures from 7/13 showing no growth at 1 day)  Wound culture ordered if any drainage noted from RLE  CRP, ESR added onto lab work  IV Zosyn every 6 hours, IV vancomycin  IV fluids x 24 hours  Pain medicine, Zofran as needed  Regular diet  Every 8 vitals  CBC, CMP in a.m.    #Elevated AST (AST 53 today, AST 18 on 7/13)  Monitor with a.m. labs    Continue home medications as appropriate    #DVT prophylaxis  Ambulation as tolerated.   Medications reconciled.  Total time spent 45 minutes.  Time spent on patient interaction/counseling/assessment and plan and documentation and discussing plan of care with the wife who is present in the room.    I spent 45 minutes in the professional and overall care of this patient.    Ralf Jackson MD

## 2024-07-16 ENCOUNTER — APPOINTMENT (OUTPATIENT)
Dept: RADIOLOGY | Facility: HOSPITAL | Age: 29
End: 2024-07-16
Payer: COMMERCIAL

## 2024-07-16 LAB
ALBUMIN SERPL BCP-MCNC: 3.3 G/DL (ref 3.4–5)
ALP SERPL-CCNC: 103 U/L (ref 33–120)
ALT SERPL W P-5'-P-CCNC: 102 U/L (ref 10–52)
ANION GAP SERPL CALC-SCNC: 11 MMOL/L (ref 10–20)
AST SERPL W P-5'-P-CCNC: 107 U/L (ref 9–39)
BILIRUB SERPL-MCNC: 0.6 MG/DL (ref 0–1.2)
BUN SERPL-MCNC: 8 MG/DL (ref 6–23)
CALCIUM SERPL-MCNC: 8.3 MG/DL (ref 8.6–10.3)
CHLORIDE SERPL-SCNC: 103 MMOL/L (ref 98–107)
CO2 SERPL-SCNC: 23 MMOL/L (ref 21–32)
CREAT SERPL-MCNC: 1.12 MG/DL (ref 0.5–1.3)
EGFRCR SERPLBLD CKD-EPI 2021: >90 ML/MIN/1.73M*2
ERYTHROCYTE [DISTWIDTH] IN BLOOD BY AUTOMATED COUNT: 12.7 % (ref 11.5–14.5)
GLUCOSE SERPL-MCNC: 106 MG/DL (ref 74–99)
HCT VFR BLD AUTO: 34.5 % (ref 41–52)
HGB BLD-MCNC: 12.2 G/DL (ref 13.5–17.5)
MCH RBC QN AUTO: 32 PG (ref 26–34)
MCHC RBC AUTO-ENTMCNC: 35.4 G/DL (ref 32–36)
MCV RBC AUTO: 91 FL (ref 80–100)
NRBC BLD-RTO: 0 /100 WBCS (ref 0–0)
PLATELET # BLD AUTO: 126 X10*3/UL (ref 150–450)
POTASSIUM SERPL-SCNC: 3.6 MMOL/L (ref 3.5–5.3)
PROT SERPL-MCNC: 5.8 G/DL (ref 6.4–8.2)
RBC # BLD AUTO: 3.81 X10*6/UL (ref 4.5–5.9)
SODIUM SERPL-SCNC: 133 MMOL/L (ref 136–145)
VANCOMYCIN SERPL-MCNC: 12.9 UG/ML (ref 5–20)
WBC # BLD AUTO: 8.6 X10*3/UL (ref 4.4–11.3)

## 2024-07-16 PROCEDURE — 36415 COLL VENOUS BLD VENIPUNCTURE: CPT | Performed by: PHYSICIAN ASSISTANT

## 2024-07-16 PROCEDURE — 99222 1ST HOSP IP/OBS MODERATE 55: CPT | Performed by: SURGERY

## 2024-07-16 PROCEDURE — 2500000001 HC RX 250 WO HCPCS SELF ADMINISTERED DRUGS (ALT 637 FOR MEDICARE OP): Performed by: INTERNAL MEDICINE

## 2024-07-16 PROCEDURE — 2550000001 HC RX 255 CONTRASTS: Performed by: INTERNAL MEDICINE

## 2024-07-16 PROCEDURE — 73706 CT ANGIO LWR EXTR W/O&W/DYE: CPT | Mod: RIGHT SIDE | Performed by: RADIOLOGY

## 2024-07-16 PROCEDURE — 2500000004 HC RX 250 GENERAL PHARMACY W/ HCPCS (ALT 636 FOR OP/ED): Performed by: INTERNAL MEDICINE

## 2024-07-16 PROCEDURE — 1100000001 HC PRIVATE ROOM DAILY

## 2024-07-16 PROCEDURE — 73706 CT ANGIO LWR EXTR W/O&W/DYE: CPT | Mod: RT

## 2024-07-16 PROCEDURE — 80053 COMPREHEN METABOLIC PANEL: CPT | Performed by: PHYSICIAN ASSISTANT

## 2024-07-16 PROCEDURE — 2500000004 HC RX 250 GENERAL PHARMACY W/ HCPCS (ALT 636 FOR OP/ED): Performed by: PHYSICIAN ASSISTANT

## 2024-07-16 PROCEDURE — 76705 ECHO EXAM OF ABDOMEN: CPT | Performed by: RADIOLOGY

## 2024-07-16 PROCEDURE — 76705 ECHO EXAM OF ABDOMEN: CPT

## 2024-07-16 PROCEDURE — 80202 ASSAY OF VANCOMYCIN: CPT | Performed by: PHYSICIAN ASSISTANT

## 2024-07-16 PROCEDURE — 85027 COMPLETE CBC AUTOMATED: CPT | Performed by: PHYSICIAN ASSISTANT

## 2024-07-16 RX ORDER — LINEZOLID 2 MG/ML
600 INJECTION, SOLUTION INTRAVENOUS EVERY 12 HOURS
Status: DISCONTINUED | OUTPATIENT
Start: 2024-07-16 | End: 2024-07-20

## 2024-07-16 RX ORDER — TRAMADOL HYDROCHLORIDE 50 MG/1
50 TABLET ORAL EVERY 4 HOURS PRN
Status: DISCONTINUED | OUTPATIENT
Start: 2024-07-16 | End: 2024-07-19

## 2024-07-16 RX ORDER — HYDROMORPHONE HYDROCHLORIDE 1 MG/ML
1 INJECTION, SOLUTION INTRAMUSCULAR; INTRAVENOUS; SUBCUTANEOUS EVERY 4 HOURS PRN
Status: DISCONTINUED | OUTPATIENT
Start: 2024-07-16 | End: 2024-07-19

## 2024-07-16 RX ORDER — POLYETHYLENE GLYCOL 3350 17 G/17G
17 POWDER, FOR SOLUTION ORAL DAILY PRN
Status: DISCONTINUED | OUTPATIENT
Start: 2024-07-16 | End: 2024-07-20 | Stop reason: HOSPADM

## 2024-07-16 RX ORDER — CLINDAMYCIN PHOSPHATE 900 MG/50ML
900 INJECTION, SOLUTION INTRAVENOUS EVERY 8 HOURS
Status: DISCONTINUED | OUTPATIENT
Start: 2024-07-16 | End: 2024-07-18

## 2024-07-16 ASSESSMENT — COGNITIVE AND FUNCTIONAL STATUS - GENERAL
MOBILITY SCORE: 22
CLIMB 3 TO 5 STEPS WITH RAILING: A LITTLE
DAILY ACTIVITIY SCORE: 24
WALKING IN HOSPITAL ROOM: A LITTLE

## 2024-07-16 ASSESSMENT — PAIN SCALES - GENERAL
PAINLEVEL_OUTOF10: 10 - WORST POSSIBLE PAIN
PAINLEVEL_OUTOF10: 9
PAINLEVEL_OUTOF10: 9
PAINLEVEL_OUTOF10: 8
PAINLEVEL_OUTOF10: 7
PAINLEVEL_OUTOF10: 5 - MODERATE PAIN
PAINLEVEL_OUTOF10: 4
PAINLEVEL_OUTOF10: 5 - MODERATE PAIN
PAINLEVEL_OUTOF10: 6
PAINLEVEL_OUTOF10: 8
PAINLEVEL_OUTOF10: 9
PAINLEVEL_OUTOF10: 8
PAINLEVEL_OUTOF10: 8
PAINLEVEL_OUTOF10: 7
PAINLEVEL_OUTOF10: 6

## 2024-07-16 ASSESSMENT — PAIN DESCRIPTION - LOCATION
LOCATION: LEG

## 2024-07-16 ASSESSMENT — PAIN SCALES - PAIN ASSESSMENT IN ADVANCED DEMENTIA (PAINAD): TOTALSCORE: MEDICATION (SEE MAR)

## 2024-07-16 ASSESSMENT — PAIN DESCRIPTION - DESCRIPTORS: DESCRIPTORS: THROBBING

## 2024-07-16 ASSESSMENT — ACTIVITIES OF DAILY LIVING (ADL): LACK_OF_TRANSPORTATION: NO

## 2024-07-16 ASSESSMENT — PAIN DESCRIPTION - ORIENTATION
ORIENTATION: RIGHT

## 2024-07-16 NOTE — CONSULTS
Consults  Referring physician Dr. Jackson  History of present illness    28-year-old male who came to the ER with worsening right leg swelling.  States about a week and a half ago he had poison ivy in his right lower extremity over the past few days he started noticing increasing redness and swelling.  He states they are having a birthday party for their daughter on Saturday and he started limping due to the pain.  Came in for further evaluation was called for further antibiotic management    Past medical history none  Past surgical history none  Social history admits to vaping daily no drug alcohol or cigarette use  Family history no sick contacts  Allergies doxycycline  A 10 point review system patient denying complaints outside of those listed in HPI above    Physical exam  HEENT PERRLA  Chest fair entry bilaterally  CVS S1-S2 regular  Abdomen soft nontender positive bowel sounds  Extremities right leg is grossly swollen.  He has a leading edge border drawn and it seems to be extending above this.  His right lower extremity area around the calf area is hot to touch no crepitus noted and also his right upper inner thigh has redness and he has pain in the groin area    Impression:  28-year-old male with cellulitis which is quite severe.  Concerned he may have an abscess and/or necrotizing fasciitis.  At this time will recommend the following    Suggestion:  1.  Order a stat CT of his right thigh and lower leg.  Nurse to call with results  2.  For now we will continue current antibiotics  3.  We will get a CBC in the morning to trend the white count  Thank for this consult follow with you as needed    I have reviewed and interpreted all lab test imaging studies and documentations from other healthcare providers  I am monitoring antibiotics for side effects, toxicity and vancomycin levels

## 2024-07-16 NOTE — PROGRESS NOTES
Vancomycin Dosing by Pharmacy- FOLLOW UP    Mac Perez is a 28 y.o. year old male who Pharmacy has been consulted for vancomycin dosing for cellulitis, skin and soft tissue. Based on the patient's indication and renal status this patient is being dosed based on a goal AUC of 400-600.     Renal function is currently stable.    Current vancomycin dose: 1250 mg given every 12 hours    Estimated vancomycin AUC on current dose: 414 mg/L.hr     Visit Vitals  /64 (BP Location: Left arm, Patient Position: Lying)   Pulse 74   Temp 36.7 °C (98.1 °F) (Temporal)   Resp 18        Lab Results   Component Value Date    CREATININE 1.12 07/16/2024    CREATININE 1.23 07/15/2024    CREATININE 1.04 07/13/2024    CREATININE 1.02 12/11/2023        No results found for the encounter in last 14 days.      I/O last 3 completed shifts:  In: 1130 (13.1 mL/kg) [I.V.:780 (9.1 mL/kg); IV Piggyback:350]  Out: - (0 mL/kg)   Weight: 86.2 kg       Assessment/Plan    Day #2 of vancomycin therapy.   Within goal AUC range. Continue current vancomycin regimen.    This dosing regimen is predicted by [employer] to result in the following pharmacokinetic parameters:  AUC24,ss: 414 mg/L.hr  Probability of AUC24 > 400: 58 %  Ctrough,ss: 11.3 mg/L  Probability of Ctrough,ss > 20: 3 %  Probability of nephrotoxicity (Lodise BELÉN 2009): 7 %    The next level will be obtained on 7/19 with Mid-AM labs. May be obtained sooner if clinically indicated.   Will continue to monitor renal function daily while on vancomycin and order serum creatinine at least every 48 hours if not already ordered.  Will follow for continued vancomycin needs, clinical response, and signs/symptoms of toxicity.     Please call with any questions.  Arlene Byrne, PharmD, BCCCP  [phone]

## 2024-07-16 NOTE — CARE PLAN
The clinical goals for the shift include pain management and IV antibiotics      Problem: Pain  Goal: Takes deep breaths with improved pain control throughout the shift  Outcome: Progressing  Goal: Turns in bed with improved pain control throughout the shift  Outcome: Progressing  Goal: Walks with improved pain control throughout the shift  Outcome: Progressing  Goal: Performs ADL's with improved pain control throughout shift  Outcome: Progressing  Goal: Participates in PT with improved pain control throughout the shift  Outcome: Progressing  Goal: Free from opioid side effects throughout the shift  Outcome: Progressing  Goal: Free from acute confusion related to pain meds throughout the shift  Outcome: Progressing     Problem: Skin  Goal: Decreased wound size/increased tissue granulation at next dressing change  Outcome: Progressing  Goal: Participates in plan/prevention/treatment measures  Outcome: Progressing  Goal: Prevent/manage excess moisture  Outcome: Progressing  Goal: Prevent/minimize sheer/friction injuries  Outcome: Progressing  Goal: Promote/optimize nutrition  Outcome: Progressing  Goal: Promote skin healing  Outcome: Progressing

## 2024-07-16 NOTE — PROGRESS NOTES
07/16/24 1100   Discharge Planning   Living Arrangements Family members   Support Systems Family members   Assistance Needed na   Type of Residence Private residence   Who is requesting discharge planning? Provider   Expected Discharge Disposition Home   Transportation Needs   In the past 12 months, has lack of transportation kept you from medical appointments or from getting medications? no   In the past 12 months, has lack of transportation kept you from meetings, work, or from getting things needed for daily living? No     Called patient in room to assess discharge planning. Introduced self and role. Pt lives at home with family.  Pt is independent and drives himself to appointments.  Pt confirms his PCP is Dr. Darien Bain, however has not seen this doctor within the past year, pt plans on making an [place]ia soon.  Pt is currently on IV ATB.  If long term IV ATB are recommended, wife, Linda ( who is a a nurse) is willing to assist and be a learner.  TCC team to follow.

## 2024-07-17 ENCOUNTER — PREP FOR PROCEDURE (OUTPATIENT)
Dept: VASCULAR SURGERY | Facility: HOSPITAL | Age: 29
End: 2024-07-17
Payer: COMMERCIAL

## 2024-07-17 ENCOUNTER — ANESTHESIA (OUTPATIENT)
Dept: OPERATING ROOM | Facility: HOSPITAL | Age: 29
End: 2024-07-17
Payer: COMMERCIAL

## 2024-07-17 ENCOUNTER — ANESTHESIA EVENT (OUTPATIENT)
Dept: OPERATING ROOM | Facility: HOSPITAL | Age: 29
End: 2024-07-17
Payer: COMMERCIAL

## 2024-07-17 DIAGNOSIS — L02.415 ABSCESS OF RIGHT LOWER EXTREMITY: Primary | ICD-10-CM

## 2024-07-17 LAB
ALBUMIN SERPL BCP-MCNC: 3.4 G/DL (ref 3.4–5)
ALP SERPL-CCNC: 92 U/L (ref 33–120)
ALT SERPL W P-5'-P-CCNC: 66 U/L (ref 10–52)
ANION GAP SERPL CALC-SCNC: 12 MMOL/L (ref 10–20)
AST SERPL W P-5'-P-CCNC: 45 U/L (ref 9–39)
BILIRUB SERPL-MCNC: 0.7 MG/DL (ref 0–1.2)
BUN SERPL-MCNC: 6 MG/DL (ref 6–23)
CALCIUM SERPL-MCNC: 8.3 MG/DL (ref 8.6–10.3)
CHLORIDE SERPL-SCNC: 99 MMOL/L (ref 98–107)
CO2 SERPL-SCNC: 27 MMOL/L (ref 21–32)
CREAT SERPL-MCNC: 1.09 MG/DL (ref 0.5–1.3)
EGFRCR SERPLBLD CKD-EPI 2021: >90 ML/MIN/1.73M*2
ERYTHROCYTE [DISTWIDTH] IN BLOOD BY AUTOMATED COUNT: 12.8 % (ref 11.5–14.5)
GLUCOSE SERPL-MCNC: 90 MG/DL (ref 74–99)
HCT VFR BLD AUTO: 35.3 % (ref 41–52)
HGB BLD-MCNC: 12.5 G/DL (ref 13.5–17.5)
MCH RBC QN AUTO: 32 PG (ref 26–34)
MCHC RBC AUTO-ENTMCNC: 35.4 G/DL (ref 32–36)
MCV RBC AUTO: 90 FL (ref 80–100)
NRBC BLD-RTO: 0 /100 WBCS (ref 0–0)
PLATELET # BLD AUTO: 154 X10*3/UL (ref 150–450)
POTASSIUM SERPL-SCNC: 3.6 MMOL/L (ref 3.5–5.3)
PROT SERPL-MCNC: 5.6 G/DL (ref 6.4–8.2)
RBC # BLD AUTO: 3.91 X10*6/UL (ref 4.5–5.9)
SODIUM SERPL-SCNC: 134 MMOL/L (ref 136–145)
WBC # BLD AUTO: 8.2 X10*3/UL (ref 4.4–11.3)

## 2024-07-17 PROCEDURE — 2500000004 HC RX 250 GENERAL PHARMACY W/ HCPCS (ALT 636 FOR OP/ED)

## 2024-07-17 PROCEDURE — 2500000004 HC RX 250 GENERAL PHARMACY W/ HCPCS (ALT 636 FOR OP/ED): Performed by: SURGERY

## 2024-07-17 PROCEDURE — A10060 PR DRAIN SKIN ABSCESS SIMPLE: Performed by: ANESTHESIOLOGY

## 2024-07-17 PROCEDURE — 7100000001 HC RECOVERY ROOM TIME - INITIAL BASE CHARGE: Performed by: SURGERY

## 2024-07-17 PROCEDURE — 1100000001 HC PRIVATE ROOM DAILY

## 2024-07-17 PROCEDURE — 0JBN0ZZ EXCISION OF RIGHT LOWER LEG SUBCUTANEOUS TISSUE AND FASCIA, OPEN APPROACH: ICD-10-PCS | Performed by: SURGERY

## 2024-07-17 PROCEDURE — 85027 COMPLETE CBC AUTOMATED: CPT | Performed by: PHYSICIAN ASSISTANT

## 2024-07-17 PROCEDURE — 3700000001 HC GENERAL ANESTHESIA TIME - INITIAL BASE CHARGE: Performed by: SURGERY

## 2024-07-17 PROCEDURE — 2500000001 HC RX 250 WO HCPCS SELF ADMINISTERED DRUGS (ALT 637 FOR MEDICARE OP): Performed by: INTERNAL MEDICINE

## 2024-07-17 PROCEDURE — 3600000002 HC OR TIME - INITIAL BASE CHARGE - PROCEDURE LEVEL TWO: Performed by: SURGERY

## 2024-07-17 PROCEDURE — 36415 COLL VENOUS BLD VENIPUNCTURE: CPT | Performed by: PHYSICIAN ASSISTANT

## 2024-07-17 PROCEDURE — 7100000002 HC RECOVERY ROOM TIME - EACH INCREMENTAL 1 MINUTE: Performed by: SURGERY

## 2024-07-17 PROCEDURE — 2500000005 HC RX 250 GENERAL PHARMACY W/O HCPCS

## 2024-07-17 PROCEDURE — 2500000004 HC RX 250 GENERAL PHARMACY W/ HCPCS (ALT 636 FOR OP/ED): Performed by: PHYSICIAN ASSISTANT

## 2024-07-17 PROCEDURE — 2500000001 HC RX 250 WO HCPCS SELF ADMINISTERED DRUGS (ALT 637 FOR MEDICARE OP): Performed by: SURGERY

## 2024-07-17 PROCEDURE — 3700000002 HC GENERAL ANESTHESIA TIME - EACH INCREMENTAL 1 MINUTE: Performed by: SURGERY

## 2024-07-17 PROCEDURE — A10060 PR DRAIN SKIN ABSCESS SIMPLE: Performed by: NURSE ANESTHETIST, CERTIFIED REGISTERED

## 2024-07-17 PROCEDURE — 2500000004 HC RX 250 GENERAL PHARMACY W/ HCPCS (ALT 636 FOR OP/ED): Performed by: NURSE ANESTHETIST, CERTIFIED REGISTERED

## 2024-07-17 PROCEDURE — 84075 ASSAY ALKALINE PHOSPHATASE: CPT | Performed by: PHYSICIAN ASSISTANT

## 2024-07-17 PROCEDURE — 2720000007 HC OR 272 NO HCPCS: Performed by: SURGERY

## 2024-07-17 PROCEDURE — 2500000005 HC RX 250 GENERAL PHARMACY W/O HCPCS: Performed by: SURGERY

## 2024-07-17 PROCEDURE — 3600000007 HC OR TIME - EACH INCREMENTAL 1 MINUTE - PROCEDURE LEVEL TWO: Performed by: SURGERY

## 2024-07-17 PROCEDURE — 2500000004 HC RX 250 GENERAL PHARMACY W/ HCPCS (ALT 636 FOR OP/ED): Performed by: INTERNAL MEDICINE

## 2024-07-17 PROCEDURE — 11043 DBRDMT MUSC&/FSCA 1ST 20/<: CPT | Performed by: SURGERY

## 2024-07-17 PROCEDURE — 87070 CULTURE OTHR SPECIMN AEROBIC: CPT | Mod: PARLAB | Performed by: SURGERY

## 2024-07-17 RX ORDER — MEPERIDINE HYDROCHLORIDE 25 MG/ML
INJECTION INTRAMUSCULAR; INTRAVENOUS; SUBCUTANEOUS AS NEEDED
Status: DISCONTINUED | OUTPATIENT
Start: 2024-07-17 | End: 2024-07-17

## 2024-07-17 RX ORDER — FENTANYL CITRATE 50 UG/ML
INJECTION, SOLUTION INTRAMUSCULAR; INTRAVENOUS AS NEEDED
Status: DISCONTINUED | OUTPATIENT
Start: 2024-07-17 | End: 2024-07-17

## 2024-07-17 RX ORDER — LIDOCAINE HYDROCHLORIDE 10 MG/ML
INJECTION INFILTRATION; PERINEURAL AS NEEDED
Status: DISCONTINUED | OUTPATIENT
Start: 2024-07-17 | End: 2024-07-17 | Stop reason: HOSPADM

## 2024-07-17 RX ORDER — ENOXAPARIN SODIUM 100 MG/ML
40 INJECTION SUBCUTANEOUS DAILY
Status: DISCONTINUED | OUTPATIENT
Start: 2024-07-18 | End: 2024-07-20 | Stop reason: HOSPADM

## 2024-07-17 RX ORDER — ONDANSETRON HYDROCHLORIDE 2 MG/ML
INJECTION, SOLUTION INTRAVENOUS AS NEEDED
Status: DISCONTINUED | OUTPATIENT
Start: 2024-07-17 | End: 2024-07-17

## 2024-07-17 RX ORDER — BUPIVACAINE HYDROCHLORIDE 5 MG/ML
INJECTION, SOLUTION PERINEURAL AS NEEDED
Status: DISCONTINUED | OUTPATIENT
Start: 2024-07-17 | End: 2024-07-17 | Stop reason: HOSPADM

## 2024-07-17 RX ORDER — MIDAZOLAM HYDROCHLORIDE 1 MG/ML
INJECTION, SOLUTION INTRAMUSCULAR; INTRAVENOUS AS NEEDED
Status: DISCONTINUED | OUTPATIENT
Start: 2024-07-17 | End: 2024-07-17

## 2024-07-17 RX ORDER — PROPOFOL 10 MG/ML
INJECTION, EMULSION INTRAVENOUS AS NEEDED
Status: DISCONTINUED | OUTPATIENT
Start: 2024-07-17 | End: 2024-07-17

## 2024-07-17 RX ORDER — KETOROLAC TROMETHAMINE 30 MG/ML
30 INJECTION, SOLUTION INTRAMUSCULAR; INTRAVENOUS ONCE
Status: COMPLETED | OUTPATIENT
Start: 2024-07-17 | End: 2024-07-17

## 2024-07-17 RX ORDER — LIDOCAINE HCL/PF 100 MG/5ML
SYRINGE (ML) INTRAVENOUS AS NEEDED
Status: DISCONTINUED | OUTPATIENT
Start: 2024-07-17 | End: 2024-07-17

## 2024-07-17 SDOH — HEALTH STABILITY: MENTAL HEALTH: CURRENT SMOKER: 1

## 2024-07-17 ASSESSMENT — PAIN SCALES - GENERAL
PAINLEVEL_OUTOF10: 7
PAINLEVEL_OUTOF10: 0 - NO PAIN
PAINLEVEL_OUTOF10: 8
PAINLEVEL_OUTOF10: 7
PAINLEVEL_OUTOF10: 2
PAINLEVEL_OUTOF10: 5 - MODERATE PAIN
PAINLEVEL_OUTOF10: 9
PAINLEVEL_OUTOF10: 6
PAINLEVEL_OUTOF10: 0 - NO PAIN
PAINLEVEL_OUTOF10: 5 - MODERATE PAIN
PAINLEVEL_OUTOF10: 4
PAINLEVEL_OUTOF10: 0 - NO PAIN
PAINLEVEL_OUTOF10: 1
PAINLEVEL_OUTOF10: 4
PAINLEVEL_OUTOF10: 4
PAINLEVEL_OUTOF10: 8
PAINLEVEL_OUTOF10: 9
PAIN_LEVEL: 0
PAINLEVEL_OUTOF10: 9

## 2024-07-17 ASSESSMENT — PAIN DESCRIPTION - ORIENTATION
ORIENTATION: RIGHT

## 2024-07-17 ASSESSMENT — COGNITIVE AND FUNCTIONAL STATUS - GENERAL
WALKING IN HOSPITAL ROOM: A LITTLE
MOBILITY SCORE: 22
DAILY ACTIVITIY SCORE: 24
CLIMB 3 TO 5 STEPS WITH RAILING: A LITTLE
DAILY ACTIVITIY SCORE: 24
CLIMB 3 TO 5 STEPS WITH RAILING: A LITTLE
WALKING IN HOSPITAL ROOM: A LITTLE
MOBILITY SCORE: 22

## 2024-07-17 ASSESSMENT — PAIN DESCRIPTION - DESCRIPTORS
DESCRIPTORS: THROBBING
DESCRIPTORS: THROBBING

## 2024-07-17 ASSESSMENT — PAIN SCALES - PAIN ASSESSMENT IN ADVANCED DEMENTIA (PAINAD): TOTALSCORE: MEDICATION (SEE MAR)

## 2024-07-17 ASSESSMENT — PAIN DESCRIPTION - LOCATION
LOCATION: LEG

## 2024-07-17 NOTE — ANESTHESIA POSTPROCEDURE EVALUATION
Patient: Mac Perez    Procedure Summary       Date: 07/17/24 Room / Location: [facility] OR 02 / Virtual [facility] OR    Anesthesia Start: 1442 Anesthesia Stop:     Procedure: INCISION & DRAINAGE OF RIGHT LEG ABSCESS (Right: Leg Lower) Diagnosis:       Abscess of right lower extremity      (Abscess of right lower extremity [L02.415])    Surgeons: Obed Taylor DO Responsible Provider: Mamadou Melgoza MD    Anesthesia Type: general, MAC ASA Status: 2            Anesthesia Type: general, MAC    Vitals Value Taken Time   /73 07/17/24 1549   Temp 36.9 °C (98.4 °F) 07/17/24 1548   Pulse 88 07/17/24 1550   Resp 18 07/17/24 1548   SpO2 100 % 07/17/24 1550   Vitals shown include unfiled device data.    Anesthesia Post Evaluation    Patient location during evaluation: PACU  Patient participation: complete - patient participated  Level of consciousness: awake  Pain score: 0  Pain management: adequate  Airway patency: patent  Cardiovascular status: acceptable, blood pressure returned to baseline and hemodynamically stable  Respiratory status: acceptable and face mask  Hydration status: acceptable  Postoperative Nausea and Vomiting: none        There were no known notable events for this encounter.

## 2024-07-17 NOTE — CARE PLAN
Problem: Pain  Goal: Takes deep breaths with improved pain control throughout the shift  Outcome: Progressing  Goal: Turns in bed with improved pain control throughout the shift  Outcome: Progressing  Goal: Walks with improved pain control throughout the shift  Outcome: Progressing  Goal: Performs ADL's with improved pain control throughout shift  Outcome: Progressing  Goal: Participates in PT with improved pain control throughout the shift  Outcome: Progressing  Goal: Free from opioid side effects throughout the shift  Outcome: Progressing  Goal: Free from acute confusion related to pain meds throughout the shift  Outcome: Progressing   The patient's goals for the shift include      The clinical goals for the shift include pain management

## 2024-07-17 NOTE — PROGRESS NOTES
Mac Perez is a 28 y.o. male on day 1 of admission presenting with Cellulitis of right lower extremity.      Subjective   Patient seen and examined by me.  His wife is present in the room and nursing is also present in the room.  Patient was seen by infectious disease doctors at [facility] earlier this afternoon and she had ordered a stat CT scan of the right thigh and leg for further evaluation of his ongoing worsening swelling and pain and erythema in the lower leg and the thigh region going up to the groin.  Patient has had pain which is severe in that extremity and his IV pain medications were switched to IV Dilaudid 2 mg IV every 3-4 hours as needed this a.m.  He states IV Dilaudid is helping.  He was not able to tolerate IV morphine due to nausea.  Patient is afebrile.  ID had recommended patient to continue IV vancomycin and IV Zosyn for ongoing worsening severe right leg cellulitis.  Labs and vitals noted.       Objective     Last Recorded Vitals  /65 (BP Location: Right arm, Patient Position: Lying)   Pulse 83   Temp 36.2 °C (97.2 °F) (Temporal)   Resp 18   Wt 86.2 kg (190 lb)   SpO2 99%   Intake/Output last 3 Shifts:    Intake/Output Summary (Last 24 hours) at 7/16/2024 2105  Last data filed at 7/16/2024 1712  Gross per 24 hour   Intake 1330 ml   Output 1350 ml   Net -20 ml       Admission Weight  Weight: 86.2 kg (190 lb) (07/15/24 1127)    Daily Weight  07/15/24 : 86.2 kg (190 lb)    Image Results  XR femur right 2+ views, XR tibia fibula right 2 views  Narrative: Interpreted By:  Cynthia Keita,   STUDY:  XR FEMUR RIGHT 2+ VIEWS; XR TIBIA FIBULA RIGHT 2 VIEWS;  7/15/2024  12:57 pm      INDICATION:  Signs/Symptoms:edema erythema cellulitis.      COMPARISON:  None.      ACCESSION NUMBER(S):  [id number]; [id number]      ORDERING CLINICIAN:  BHARATHI MALDONADO      FINDINGS:  No acute fracture or osseous destruction. Imaged hip, knee and ankle  joints are grossly unremarkable. No knee joint  effusion. Diffuse  subcutaneous edema more pronounced in the lower leg.      Impression: No acute osseous abnormality.      MACRO  None      Signed by: YeseniaChilo Sanabriaan 7/15/2024 1:17 PM  Dictation workstation:   [id number]      Physical Exam  Vitals and nursing note reviewed.   Constitutional:       General: He is not in acute distress.     Appearance: Normal appearance. He is normal weight. He is not ill-appearing or toxic-appearing.   HENT:      Head: Normocephalic and atraumatic.      Right Ear: Tympanic membrane and ear canal normal. There is no impacted cerumen.      Left Ear: Tympanic membrane, ear canal and external ear normal.      Nose: Nose normal. No congestion or rhinorrhea.      Mouth/Throat:      Pharynx: Oropharynx is clear. No oropharyngeal exudate or posterior oropharyngeal erythema.   Eyes:      General: No scleral icterus.        Right eye: No discharge.         Left eye: No discharge.      Extraocular Movements: Extraocular movements intact.      Conjunctiva/sclera: Conjunctivae normal.      Pupils: Pupils are equal, round, and reactive to light.   Neck:      Vascular: No carotid bruit.   Cardiovascular:      Rate and Rhythm: Normal rate and regular rhythm.      Pulses: Normal pulses.           Carotid pulses are 2+ on the right side and 2+ on the left side.       Radial pulses are 2+ on the right side and 2+ on the left side.        Femoral pulses are 2+ on the right side and 2+ on the left side.       Popliteal pulses are 2+ on the right side and 2+ on the left side.        Dorsalis pedis pulses are 2+ on the right side and 2+ on the left side.        Posterior tibial pulses are 2+ on the right side and 2+ on the left side.      Heart sounds: Normal heart sounds. No murmur heard.     No gallop.      Comments: Right thigh and right lower leg and right foot edema with erythema and tenderness and swelling consistent with severe cellulitis/  Pulmonary:      Effort: Pulmonary effort is normal. No  respiratory distress.      Breath sounds: Normal breath sounds. No wheezing, rhonchi or rales.   Abdominal:      General: Abdomen is flat. Bowel sounds are normal. There is no distension.      Palpations: Abdomen is soft. There is no mass.      Tenderness: There is no abdominal tenderness. There is no right CVA tenderness, left CVA tenderness, guarding or rebound.      Hernia: No hernia is present.   Musculoskeletal:         General: No swelling or tenderness. Normal range of motion.      Cervical back: Normal range of motion and neck supple. No rigidity.      Right lower leg: Edema present.      Left lower leg: No edema.      Comments: Positive for right upper thigh posterior and anterior aspect/lower leg swelling and worsening erythema/few blisters which were draining this a.m. and severe cellulitis.   Lymphadenopathy:      Cervical: No cervical adenopathy.   Skin:     General: Skin is warm.      Coloration: Skin is not jaundiced.      Findings: Erythema present. No rash.   Neurological:      General: No focal deficit present.      Mental Status: He is alert and oriented to person, place, and time. Mental status is at baseline.      Sensory: No sensory deficit.      Motor: No weakness.      Gait: Gait normal.      Deep Tendon Reflexes: Reflexes normal.   Psychiatric:         Mood and Affect: Mood normal.         Thought Content: Thought content normal.         Judgment: Judgment normal.         Relevant Results    Current Facility-Administered Medications:     acetaminophen (Tylenol) tablet 650 mg, 650 mg, oral, q4h PRN, 650 mg at 07/16/24 1736 **OR** acetaminophen (Tylenol) oral liquid 650 mg, 650 mg, oral, q4h PRN, Sanjay Stewart PA-C    HYDROmorphone (Dilaudid) injection 1 mg, 1 mg, intravenous, q4h PRN, Ralf Jackson MD, 1 mg at 07/16/24 2039    ondansetron (Zofran) tablet 4 mg, 4 mg, oral, q6h PRN **OR** ondansetron (Zofran) injection 4 mg, 4 mg, intravenous, q6h PRN, Sanjay Stewart PA-C, 4 mg at  07/15/24 1851    piperacillin-tazobactam (Zosyn) 4.5 g in dextrose (iso)  mL, 4.5 g, intravenous, q6h, Sanjay Stewart PA-C, Last Rate: 200 mL/hr at 07/16/24 1842, 4.5 g at 07/16/24 1842    polyethylene glycol (Glycolax, Miralax) packet 17 g, 17 g, oral, Daily PRN, Ralf Jackson MD, 17 g at 07/16/24 1026    sertraline (Zoloft) tablet 100 mg, 100 mg, oral, Daily, Sanjay Stewart PA-C    traMADol (Ultram) tablet 50 mg, 50 mg, oral, q4h PRN, Ralf Jackson MD, 50 mg at 07/16/24 1737    vancomycin (Vancocin) in dextrose 5 % water (D5W) 250 mL IV 1,250 mg, 1,250 mg, intravenous, q12h, Sanjay Stewart PA-C, Stopped at 07/16/24 0309     Results for orders placed or performed during the hospital encounter of 07/15/24 (from the past 24 hour(s))   CBC   Result Value Ref Range    WBC 8.6 4.4 - 11.3 x10*3/uL    nRBC 0.0 0.0 - 0.0 /100 WBCs    RBC 3.81 (L) 4.50 - 5.90 x10*6/uL    Hemoglobin 12.2 (L) 13.5 - 17.5 g/dL    Hematocrit 34.5 (L) 41.0 - 52.0 %    MCV 91 80 - 100 fL    MCH 32.0 26.0 - 34.0 pg    MCHC 35.4 32.0 - 36.0 g/dL    RDW 12.7 11.5 - 14.5 %    Platelets 126 (L) 150 - 450 x10*3/uL   Comprehensive Metabolic Panel   Result Value Ref Range    Glucose 106 (H) 74 - 99 mg/dL    Sodium 133 (L) 136 - 145 mmol/L    Potassium 3.6 3.5 - 5.3 mmol/L    Chloride 103 98 - 107 mmol/L    Bicarbonate 23 21 - 32 mmol/L    Anion Gap 11 10 - 20 mmol/L    Urea Nitrogen 8 6 - 23 mg/dL    Creatinine 1.12 0.50 - 1.30 mg/dL    eGFR >90 >60 mL/min/1.73m*2    Calcium 8.3 (L) 8.6 - 10.3 mg/dL    Albumin 3.3 (L) 3.4 - 5.0 g/dL    Alkaline Phosphatase 103 33 - 120 U/L    Total Protein 5.8 (L) 6.4 - 8.2 g/dL     (H) 9 - 39 U/L    Bilirubin, Total 0.6 0.0 - 1.2 mg/dL     (H) 10 - 52 U/L   Vancomycin   Result Value Ref Range    Vancomycin 12.9 5.0 - 20.0 ug/mL       Serum lactate is normal at 1.0.  CRP is elevated at 29.92 on admission.    Assessment/Plan      #1/Right  lower extremity/upper thigh and right leg swelling  with erythema and tenderness and severe pain.  Patient has no crepitus in the extremity.  Pedal pulses are palpable./Patient has severe cellulitis of right leg and thigh.  There is concern of abscess versus necrotizing fasciitis/CT scan of the lower extremities was ordered earlier this afternoon.  Due to backup it has not been done so far and called radiology and they will do the test around 8:30 PM today.  Will await results.  ID wanted the patient to continue IV Zosyn and vancomycin for now.  He is afebrile.  LFTs increased and his wife who is a nurse did not want vancomycin continued and this was discussed with Dr. MARICHUY Gonzalez earlier as per the patient.  Patient and wife have wrists fused vancomycin for now.  They are worried about worsening worsening LFTs.  I discussed with ID and the patient and his wife about need for IV antibiotics.  Doctors at [facility] wants to wait to switch his antibiotics based on his CT of the lower extremity which is being done in the next 1 hour.  Discussed this with wife and patient and they are agreeable.  Also will continue elevation of the right leg on 2 pillows and his IV pain medications have been increased to 1 mg IV dilated every 3-4 hours as needed and he remains on p.o. tramadol for moderate pain.  He states IV Dilaudid is helping the pain.  Vascular surgery consultation will be obtained.  Case discussed with ID /Dr Arnett  about above concerns.  Case was also discussed with ICU team Dr. Roy and he will evaluate the patient as I would like the patient to be transferred to ICU for further treatment and management given his ongoing worsening cellulitis.  Dr. Roy will examine the patient as soon as possible.  2./Borderline hyponatremia sodium is 133 and potassium is 3.6.  3./Acute transaminitis/most likely secondary to his ongoing infection and AST ALT were normal at 14 and 18 7/13/2024 and on admission yesterday AST was 46 and ALT 53 and this a.m. AST ALT have increased to 102  and 107.  4./Mild leukocytosis/WBC count 7/13/2024 was 12.9 and yesterday on admission was 9.4 and today is 8.8 and hemoglobin is 12.2 blood cultures are negative.  .  Check repeat labs in AM.  Case fully discussed with intensivist/patient and wife and nursing.  Total time spent 40 minutes/time spent on patient interaction/counseling/assessment and plan and documentation.            Principal Problem:    Cellulitis of right lower extremity                  Ralf Jackson MD

## 2024-07-17 NOTE — PROGRESS NOTES
Vancomycin Dosing by Pharmacy- Cessation of Therapy    Consult to pharmacy for vancomycin dosing has been discontinued by the prescriber, pharmacy will sign off at this time.    Please call pharmacy if there are further questions or re-enter a consult if vancomycin is resumed.     Rosalba Gganon, [facility]

## 2024-07-17 NOTE — PROGRESS NOTES
Mac Perez is a 28 y.o. male on day 2 of admission presenting with Cellulitis of right lower extremity.      Subjective   This is a duplicate note.  Please refer to the other note done this evening.       Objective     Last Recorded Vitals  /59   Pulse 75   Temp 36.9 °C (98.4 °F) (Temporal)   Resp 16   Wt 86.2 kg (190 lb)   SpO2 96%   Intake/Output last 3 Shifts:    Intake/Output Summary (Last 24 hours) at 7/17/2024 1758  Last data filed at 7/17/2024 1551  Gross per 24 hour   Intake 1250 ml   Output 1365 ml   Net -115 ml       Admission Weight  Weight: 86.2 kg (190 lb) (07/15/24 1127)    Daily Weight  07/17/24 : 86.2 kg (190 lb)    Image Results  US right upper quadrant  Narrative: Interpreted By:  Geoffrey Rodarte,   STUDY:  US RIGHT UPPER QUADRANT;  7/16/2024 6:35 pm      INDICATION:  Signs/Symptoms:elevated LFT's.      COMPARISON:  None.      ACCESSION NUMBER(S):  [id number]      ORDERING CLINICIAN:  GORDY OLVERA      TECHNIQUE:  Multiple images of the right upper quadrant were obtained.  The study  was somewhat limited due to the patient's body habitus and overlying  bowel gas.      FINDINGS:  LIVER:  The liver measures 18.4 cm and is diffusely echogenic in appearance,  consistent with diffuse fatty infiltration. The resulting increased  beam attenuation thereby limiting evaluation of the liver for focal  lesions. Within the limitations, no focal lesions are seen.          GALLBLADDER:  The gallbladder is nondistended, and demonstrates no evidence of  gallstones, wall thickening or surrounding fluid. The gallbladder  wall thickness is 0.3 cm. Sonographic Kessler's sign is negative.          BILE DUCTS:  No evidence of intra or extrahepatic biliary dilatation is  identified; the common bile duct measures 0.6 cm.      PANCREAS:  The pancreas is poorly visualized due to overlying bowel gas.      RIGHT KIDNEY:  The right kidney measures 10.6 cm in length. The renal cortical  echogenicity and  thickness are within normal limit.  No  hydronephrosis or renal calculi are seen.      Impression: Enlarged liver with diffuse fatty infiltration. The pancreas was  poorly visualized.      Otherwise, limited, but grossly unremarkable right upper quadrant  ultrasound.      MACRO:  None      Signed by: Geoffrey Rodarte 7/17/2024 6:12 AM  Dictation workstation:   [id number]      Physical Exam    Relevant Results               Assessment/Plan                  Principal Problem:    Cellulitis of right lower extremity  Active Problems:    Abscess of right lower extremity                    Ralf Jackson MD

## 2024-07-17 NOTE — PROGRESS NOTES
Infectious disease progress note  Subjective   Severe cellulitis right leg with abscess    Antibiotics    Objective   Range of Vitals (last 24 hours)  Heart Rate:  [69-89]   Temp:  [36.2 °C (97.2 °F)-38.6 °C (101.5 °F)]   Resp:  [18]   BP: (125-129)/(65-72)   SpO2:  [96 %-99 %]   Daily Weight  07/15/24 : 86.2 kg (190 lb)    Body mass index is 29.76 kg/m².      Physical Exam  HEENT PERRLA  Chest fair air entry bilaterally  CVS S1-S2 regular  Abdomen soft nontender positive bowel sounds  Extremity right leg is much improved there is an area of fluctuance in the mid medial calf area    Relevant Results  Labs  Lab Results   Component Value Date    WBC 8.2 07/17/2024    HGB 12.5 (L) 07/17/2024    HCT 35.3 (L) 07/17/2024    MCV 90 07/17/2024     07/17/2024     Lab Results   Component Value Date    GLUCOSE 90 07/17/2024    CALCIUM 8.3 (L) 07/17/2024     (L) 07/17/2024    K 3.6 07/17/2024    CO2 27 07/17/2024    CL 99 07/17/2024    BUN 6 07/17/2024    CREATININE 1.09 07/17/2024   ESR: --  Lab Results   Component Value Date    SEDRATE 8 07/15/2024     Lab Results   Component Value Date    CRP 29.92 (H) 07/15/2024     Lab Results   Component Value Date    ALT 66 (H) 07/17/2024    AST 45 (H) 07/17/2024    ALKPHOS 92 07/17/2024    BILITOT 0.7 07/17/2024       Microbiology  7- blood cultures no growth at 2 days  7- blood cultures no growth at 1 day  7- wound cultures are pending    Imaging  7- CT of right leg shows a developing abscess in the mid anterior leg measuring 3.5 x 1 x 5 cm.  Also early necrotizing fasciitis cannot be excluded  Assessment/Plan   1.  Continue Zyvox since patient is refusing Vanco, Zosyn and clindamycin.  2.  Await surgery by vascular  I had long discussions with Dr. Brandon on multiple occasions last night waiting for CT scan and he had also evaluated the patient yesterday.  He will take deep wound cultures and let me know if there is necrotizing fasciitis and  surgery    Other issues  No past medical history    I reviewed and interpreted all lab test imaging studies and documentations from other healthcare providers  I am monitoring for antibiotic side effects and toxicity     Wendy Arnett MD

## 2024-07-17 NOTE — ANESTHESIA PREPROCEDURE EVALUATION
Patient: Mac Perez    Procedure Information       Date/Time: 07/17/24 1430    Procedure: INCISION & DRAINAGE OF RIGHT LEG ABSCESS (Right) - Incision and drainage of abscess right leg    Location: [facility] / [facility]    Surgeons: Obed Taylor, DO            Relevant Problems   Anesthesia (within normal limits)      Neuro   (+) Depression   (+) Generalized anxiety disorder      GI   (+) Gastroesophageal reflux disease       Clinical information reviewed:   Tobacco  Allergies  Meds   Med Hx  Surg Hx   Fam Hx  Soc Hx        NPO Detail:  NPO/Void Status  Date of Last Liquid: 07/17/24  Time of Last Liquid: 0000  Date of Last Solid: 07/17/24  Time of Last Solid: 0000         Physical Exam    Airway  Mallampati: II  TM distance: >3 FB  Neck ROM: full     Cardiovascular - normal exam  Rhythm: regular  Rate: normal     Dental - normal exam     Pulmonary - normal exam     Abdominal            Anesthesia Plan    History of general anesthesia?: no  History of complications of general anesthesia?: no    ASA 2     general and MAC     The patient is a current smoker.    intravenous induction   Anesthetic plan and risks discussed with patient.  Use of blood products discussed with patient who.    Plan discussed with CRNA.

## 2024-07-17 NOTE — ANESTHESIA PROCEDURE NOTES
Airway  Date/Time: 7/17/2024 2:48 PM  Urgency: elective    Airway not difficult    Staffing  Performed: CRNA   Authorized by: Mamadou Melgoza MD    Performed by: MEHUL Holt-CRNA  Patient location during procedure: OR    Indications and Patient Condition  Indications for airway management: anesthesia  Spontaneous Ventilation: absent  Sedation level: deep  Preoxygenated: yes  Patient position: sniffing  Mask difficulty assessment: 0 - not attempted  Planned trial extubation    Final Airway Details  Final airway type: supraglottic airway      Successful airway: Supraglottic airway: I-Gel.  Size 4     Number of attempts at approach: 1  Ventilation between attempts: none  Number of other approaches attempted: 0

## 2024-07-17 NOTE — PROGRESS NOTES
Mac Perez is a 28 y.o. male on day 1 of admission presenting with Cellulitis of right lower extremity.    Subjective   This patient was seen on consult for possible compartment syndrome or necrotizing fasciitis of his right lower extremity.  The patient states that he developed poison ivy about a week and a half ago.  He noticed increased pain and swelling of his right leg on Saturday.  Apparently, he did go to the ER few days ago and was given IV antibiotics.  Patient states that the pain and swelling got worse along with redness.  He also admits to some fevers and sweats and also some chills.  He currently denies any nausea or vomiting.         Objective     Vitals:    07/16/24 2000   BP: 128/65   Pulse: 83   Resp: 18   Temp: 36.2 °C (97.2 °F)   SpO2: 99%      Physical Exam  This patient is alert and oriented x 3.  He is in no acute distress.  Head is normocephalic.  Pupils are equal and reactive to light and accommodation.  Neck is soft and supple without palpable lymph nodes.  Heart is regular rate.  Lungs are clear.  Abdomen is soft with positive bowel sounds there is no pain on palpation.  Upper extremities seem to have adequate range of motion.  His left lower extremity has adequate range of motion.  He states that his right leg is somewhat tight with attempting range of motion.  He can freely move his right foot.  He has easily palpable brachial radial femoral popliteal and pedal pulses bilaterally.  He does have redness involving his right lower extremity along with the painful area involving his right medial anterior calf area.  There is no crepitus findings on physical exam.  He also has redness up to the very near the groin area on the anterior medial aspect of his right thigh.  This area is not warm to the touch.  His right lower leg in the anterior medial aspect of his calf is warm to touch.  His compartments are relatively soft on palpation but he does have pain on palpation.  Blood pressure  "128/65, pulse 83, temperature 36.2 °C (97.2 °F), temperature source Temporal, resp. rate 18, height 1.702 m (5' 7\"), weight 86.2 kg (190 lb), SpO2 99%.        Intake/Output last 3 Shifts:  I/O last 3 completed shifts:  In: 1430 (16.6 mL/kg) [I.V.:780 (9.1 mL/kg); IV Piggyback:650]  Out: 1350 (15.7 mL/kg) [Urine:1350 (0.4 mL/kg/hr)]  Weight: 86.2 kg     Patient Active Problem List    Diagnosis Date Noted    Cellulitis of right lower extremity 07/15/2024    ADHD (attention deficit hyperactivity disorder) 11/04/2023    Erectile dysfunction 11/04/2023    Generalized anxiety disorder 11/04/2023    Vitamin D deficiency 11/04/2023    Depression 03/08/2021    Gastroesophageal reflux disease 02/06/2020          Current Facility-Administered Medications:     acetaminophen (Tylenol) tablet 650 mg, 650 mg, oral, q4h PRN, 650 mg at 07/16/24 1736 **OR** acetaminophen (Tylenol) oral liquid 650 mg, 650 mg, oral, q4h PRN, Sanjay Stewart PA-C    HYDROmorphone (Dilaudid) injection 1 mg, 1 mg, intravenous, q4h PRN, Ralf Jackson MD, 1 mg at 07/16/24 2039    linezolid (Zyvox)  mg in 300 mL, 600 mg, intravenous, q12h, Wendy Arnett MD    ondansetron (Zofran) tablet 4 mg, 4 mg, oral, q6h PRN **OR** ondansetron (Zofran) injection 4 mg, 4 mg, intravenous, q6h PRN, Sanjay Stewart PA-C, 4 mg at 07/15/24 1851    piperacillin-tazobactam (Zosyn) 4.5 g in dextrose (iso)  mL, 4.5 g, intravenous, q6h, Sanjay Stewart PA-C, Last Rate: 200 mL/hr at 07/16/24 1842, 4.5 g at 07/16/24 1842    polyethylene glycol (Glycolax, Miralax) packet 17 g, 17 g, oral, Daily PRN, Ralf Jackson MD, 17 g at 07/16/24 1026    traMADol (Ultram) tablet 50 mg, 50 mg, oral, q4h PRN, Ralf Jackson MD, 50 mg at 07/16/24 2158    vancomycin (Vancocin) in dextrose 5 % water (D5W) 250 mL IV 1,250 mg, 1,250 mg, intravenous, q12h, Sanjay Stewart PA-C, Stopped at 07/16/24 0309     Lab Results   Component Value Date    WBC 8.6 07/16/2024    HGB 12.2 (L) " 07/16/2024    HCT 34.5 (L) 07/16/2024     (L) 07/16/2024     (H) 07/16/2024     (H) 07/16/2024     (L) 07/16/2024    K 3.6 07/16/2024     07/16/2024    CREATININE 1.12 07/16/2024    BUN 8 07/16/2024    CO2 23 07/16/2024       XR femur right 2+ views    Result Date: 7/15/2024  Interpreted By:  Cynthia Keita, STUDY: XR FEMUR RIGHT 2+ VIEWS; XR TIBIA FIBULA RIGHT 2 VIEWS;  7/15/2024 12:57 pm   INDICATION: Signs/Symptoms:edema erythema cellulitis.   COMPARISON: None.   ACCESSION NUMBER(S): [id number]; [id number]   ORDERING CLINICIAN: BHARATHI MALDONADO   FINDINGS: No acute fracture or osseous destruction. Imaged hip, knee and ankle joints are grossly unremarkable. No knee joint effusion. Diffuse subcutaneous edema more pronounced in the lower leg.       No acute osseous abnormality.   MACRO None   Signed by: Cynthia Keita 7/15/2024 1:17 PM Dictation workstation:   [id number]    XR tibia fibula right 2 views    Result Date: 7/15/2024  Interpreted By:  Cynthia Keita, STUDY: XR FEMUR RIGHT 2+ VIEWS; XR TIBIA FIBULA RIGHT 2 VIEWS;  7/15/2024 12:57 pm   INDICATION: Signs/Symptoms:edema erythema cellulitis.   COMPARISON: None.   ACCESSION NUMBER(S): [id number]; [id number]   ORDERING CLINICIAN: BHARATHI MALDONADO   FINDINGS: No acute fracture or osseous destruction. Imaged hip, knee and ankle joints are grossly unremarkable. No knee joint effusion. Diffuse subcutaneous edema more pronounced in the lower leg.       No acute osseous abnormality.   MACRO None   Signed by: Cynthia Keita 7/15/2024 1:17 PM Dictation workstation:   [id number]                Assessment/Plan   Principal Problem:    Cellulitis of right lower extremity      This patient was seen and evaluated at bedside for possible compartment syndrome right lower leg and possible necrotizing fasciitis.  On physical exam, he does not have compartment syndrome.  He can freely move his foot.  He does have palpable pedal pulses.   His compartments are relatively soft on palpation.  He does have redness involving his right lower leg and also anterior medial thigh area very close to the groin.  This area is not warm to touch.  He did recently have a CTA of his right leg.  A final read on the CAT scan is not done yet by the radiologist.  His white count is normal.His current temperature is 36.2.  His maximum temperature today was 101.5.  I did review his CTA of his right lower extremity.  This does show extensive cellulitis.  His muscular planes appear intact.  I will await final reading by radiologist to help discern whether there are signs of necrotizing fasciitis.  Continue IV antibiotics for now.  He needs to elevate his leg above his heart is much as possible.  Final CTA results pending.  Thank you very much for allowing me to take part in the care of your patient.  Sincerely yours, Dr. Obed Taylor, DO

## 2024-07-17 NOTE — PROGRESS NOTES
"Mac Perez is a 28 y.o. male on day 1 of admission presenting with Cellulitis of right lower extremity.    Subjective            Objective     Vitals:    07/16/24 2000   BP: 128/65   Pulse: 83   Resp: 18   Temp: 36.2 °C (97.2 °F)   SpO2: 99%      Physical Exam    Blood pressure 128/65, pulse 83, temperature 36.2 °C (97.2 °F), temperature source Temporal, resp. rate 18, height 1.702 m (5' 7\"), weight 86.2 kg (190 lb), SpO2 99%.        Intake/Output last 3 Shifts:  I/O last 3 completed shifts:  In: 1430 (16.6 mL/kg) [I.V.:780 (9.1 mL/kg); IV Piggyback:650]  Out: 1350 (15.7 mL/kg) [Urine:1350 (0.4 mL/kg/hr)]  Weight: 86.2 kg     Patient Active Problem List    Diagnosis Date Noted    Cellulitis of right lower extremity 07/15/2024    ADHD (attention deficit hyperactivity disorder) 11/04/2023    Erectile dysfunction 11/04/2023    Generalized anxiety disorder 11/04/2023    Vitamin D deficiency 11/04/2023    Depression 03/08/2021    Gastroesophageal reflux disease 02/06/2020          Current Facility-Administered Medications:     acetaminophen (Tylenol) tablet 650 mg, 650 mg, oral, q4h PRN, 650 mg at 07/16/24 1736 **OR** acetaminophen (Tylenol) oral liquid 650 mg, 650 mg, oral, q4h PRN, Sanjay Stewart PA-C    HYDROmorphone (Dilaudid) injection 1 mg, 1 mg, intravenous, q4h PRN, Ralf Jackson MD, 1 mg at 07/16/24 2039    linezolid (Zyvox)  mg in 300 mL, 600 mg, intravenous, q12h, Wendy Arnett MD    ondansetron (Zofran) tablet 4 mg, 4 mg, oral, q6h PRN **OR** ondansetron (Zofran) injection 4 mg, 4 mg, intravenous, q6h PRN, Sanjay Stewart PA-C, 4 mg at 07/15/24 1851    piperacillin-tazobactam (Zosyn) 4.5 g in dextrose (iso)  mL, 4.5 g, intravenous, q6h, Sanjay Stewart PA-C, Last Rate: 200 mL/hr at 07/16/24 1842, 4.5 g at 07/16/24 1842    polyethylene glycol (Glycolax, Miralax) packet 17 g, 17 g, oral, Daily PRN, Ralf Jackson MD, 17 g at 07/16/24 1026    traMADol (Ultram) tablet 50 mg, 50 mg, oral, " q4h PRN, Ralf Jackson MD, 50 mg at 07/16/24 2158    vancomycin (Vancocin) in dextrose 5 % water (D5W) 250 mL IV 1,250 mg, 1,250 mg, intravenous, q12h, Sanjay Stewart PA-C, Stopped at 07/16/24 0309     Lab Results   Component Value Date    WBC 8.6 07/16/2024    HGB 12.2 (L) 07/16/2024    HCT 34.5 (L) 07/16/2024     (L) 07/16/2024     (H) 07/16/2024     (H) 07/16/2024     (L) 07/16/2024    K 3.6 07/16/2024     07/16/2024    CREATININE 1.12 07/16/2024    BUN 8 07/16/2024    CO2 23 07/16/2024       XR femur right 2+ views    Result Date: 7/15/2024  Interpreted By:  Cynthia Keita, STUDY: XR FEMUR RIGHT 2+ VIEWS; XR TIBIA FIBULA RIGHT 2 VIEWS;  7/15/2024 12:57 pm   INDICATION: Signs/Symptoms:edema erythema cellulitis.   COMPARISON: None.   ACCESSION NUMBER(S): [id number]; [id number]   ORDERING CLINICIAN: BHARATHI MALDONADO   FINDINGS: No acute fracture or osseous destruction. Imaged hip, knee and ankle joints are grossly unremarkable. No knee joint effusion. Diffuse subcutaneous edema more pronounced in the lower leg.       No acute osseous abnormality.   MACRO None   Signed by: Cynthia Keita 7/15/2024 1:17 PM Dictation workstation:   [id number]    XR tibia fibula right 2 views    Result Date: 7/15/2024  Interpreted By:  Cynthia Keita, STUDY: XR FEMUR RIGHT 2+ VIEWS; XR TIBIA FIBULA RIGHT 2 VIEWS;  7/15/2024 12:57 pm   INDICATION: Signs/Symptoms:edema erythema cellulitis.   COMPARISON: None.   ACCESSION NUMBER(S): [id number]; [id number]   ORDERING CLINICIAN: BHARATHI MALDONADO   FINDINGS: No acute fracture or osseous destruction. Imaged hip, knee and ankle joints are grossly unremarkable. No knee joint effusion. Diffuse subcutaneous edema more pronounced in the lower leg.       No acute osseous abnormality.   MACRO None   Signed by: Cynthia Keita 7/15/2024 1:17 PM Dictation workstation:   [id number]                Assessment/Plan   Principal Problem:    Cellulitis of right  lower extremity    Please see my other note from 7/16/2024.  This note was mistakenly started        Obed Taylor, DO

## 2024-07-17 NOTE — PROGRESS NOTES
Mac Perez is a 28 y.o. male on day 2 of admission presenting with Cellulitis of right lower extremity.      Subjective      Patient seen and examined by me.  His grandmother and mother are present in the room and case discussed with them.  His mother states that she also discussed with Dr. Taylor after the surgery and I&D this afternoon.  Patient is anxious.  He wants to eat.  He has no nausea or vomiting.  Clear liquids were ordered and patient states that his wife will bring him some food and he plans on eating that.  Patient also states he is having leg pain and cramps in the right lower leg and his pain medications discussed with him and nursing advised on giving him the pain medications as ordered.  Patient is afebrile.  CT of the right leg was concerning for possible abscess and patient underwent I&D as per Dr. Taylor's note and vascular surgery is following the patient.  ID is also following the patient and patient remains on triple antibiotics/IV Zosyn/clindamycin/Zyvox.  Patient also states that he has not eaten much in the last 3 to 4 days and has not had a bowel movement and is requesting MiraLAX.  Vitals and labs noted.  Ultrasound of the liver which was done due to elevated LFTs shows hepatomegaly diffuse fatty liver and no other lesions/  Objective     Last Recorded Vitals  /59   Pulse 75   Temp 36.9 °C (98.4 °F) (Temporal)   Resp 16   Wt 86.2 kg (190 lb)   SpO2 96%   Intake/Output last 3 Shifts:    Intake/Output Summary (Last 24 hours) at 7/17/2024 1953  Last data filed at 7/17/2024 1926  Gross per 24 hour   Intake 1150 ml   Output 2015 ml   Net -865 ml       Admission Weight  Weight: 86.2 kg (190 lb) (07/15/24 1127)    Daily Weight  07/17/24 : 86.2 kg (190 lb)    Image Results  US right upper quadrant  Narrative: Interpreted By:  Geoffrey Rodarte,   STUDY:  US RIGHT UPPER QUADRANT;  7/16/2024 6:35 pm      INDICATION:  Signs/Symptoms:elevated LFT's.      COMPARISON:  None.      ACCESSION  NUMBER(S):  [id number]      ORDERING CLINICIAN:  GORDY OLVERA      TECHNIQUE:  Multiple images of the right upper quadrant were obtained.  The study  was somewhat limited due to the patient's body habitus and overlying  bowel gas.      FINDINGS:  LIVER:  The liver measures 18.4 cm and is diffusely echogenic in appearance,  consistent with diffuse fatty infiltration. The resulting increased  beam attenuation thereby limiting evaluation of the liver for focal  lesions. Within the limitations, no focal lesions are seen.          GALLBLADDER:  The gallbladder is nondistended, and demonstrates no evidence of  gallstones, wall thickening or surrounding fluid. The gallbladder  wall thickness is 0.3 cm. Sonographic Kessler's sign is negative.          BILE DUCTS:  No evidence of intra or extrahepatic biliary dilatation is  identified; the common bile duct measures 0.6 cm.      PANCREAS:  The pancreas is poorly visualized due to overlying bowel gas.      RIGHT KIDNEY:  The right kidney measures 10.6 cm in length. The renal cortical  echogenicity and thickness are within normal limit.  No  hydronephrosis or renal calculi are seen.      Impression: Enlarged liver with diffuse fatty infiltration. The pancreas was  poorly visualized.      Otherwise, limited, but grossly unremarkable right upper quadrant  ultrasound.      MACRO:  None      Signed by: Geoffrey Rodarte 7/17/2024 6:12 AM  Dictation workstation:   [id number]      Physical Exam  Vitals and nursing note reviewed.   Constitutional:       General: He is not in acute distress.     Appearance: Normal appearance. He is normal weight. He is not ill-appearing or toxic-appearing.   HENT:      Head: Normocephalic and atraumatic.      Right Ear: Tympanic membrane and ear canal normal. There is no impacted cerumen.      Left Ear: Tympanic membrane, ear canal and external ear normal.      Nose: Nose normal. No congestion or rhinorrhea.      Mouth/Throat:      Pharynx:  Oropharynx is clear. No oropharyngeal exudate or posterior oropharyngeal erythema.   Eyes:      General: No scleral icterus.        Right eye: No discharge.         Left eye: No discharge.      Extraocular Movements: Extraocular movements intact.      Conjunctiva/sclera: Conjunctivae normal.      Pupils: Pupils are equal, round, and reactive to light.   Neck:      Vascular: No carotid bruit.   Cardiovascular:      Rate and Rhythm: Normal rate and regular rhythm.      Pulses: Normal pulses.      Heart sounds: Normal heart sounds. No murmur heard.     No gallop.   Pulmonary:      Effort: Pulmonary effort is normal. No respiratory distress.      Breath sounds: Normal breath sounds. No wheezing, rhonchi or rales.   Abdominal:      General: Abdomen is flat. Bowel sounds are normal. There is no distension.      Palpations: Abdomen is soft. There is no mass.      Tenderness: There is no abdominal tenderness. There is no right CVA tenderness, left CVA tenderness, guarding or rebound.      Hernia: No hernia is present.   Musculoskeletal:         General: No swelling or tenderness. Normal range of motion.      Cervical back: Normal range of motion and neck supple. No rigidity.      Right lower leg: No edema.      Left lower leg: No edema.   Lymphadenopathy:      Cervical: No cervical adenopathy.   Skin:     General: Skin is warm.      Coloration: Skin is not jaundiced.      Findings: No erythema or rash.      Comments: Patient has an Ace wrap around his right thigh and right lower leg/has edema of the right ankle and decreasing erythema of the foot and ankle/   Neurological:      General: No focal deficit present.      Mental Status: He is alert and oriented to person, place, and time. Mental status is at baseline.      Sensory: No sensory deficit.      Motor: No weakness.      Gait: Gait normal.      Deep Tendon Reflexes: Reflexes normal.   Psychiatric:         Mood and Affect: Mood is anxious.         Thought Content:  Thought content normal.         Judgment: Judgment normal.         Relevant Results    Current Facility-Administered Medications:     acetaminophen (Tylenol) tablet 650 mg, 650 mg, oral, q4h PRN, 650 mg at 07/16/24 1736 **OR** acetaminophen (Tylenol) oral liquid 650 mg, 650 mg, oral, q4h PRN, Obed Taylor DO    clindamycin (Cleocin) 900 mg in dextrose 5% IV 50 mL, 900 mg, intravenous, q8h, Obed Taylor DO, Stopped at 07/17/24 1301    HYDROmorphone (Dilaudid) injection 1 mg, 1 mg, intravenous, q4h PRN, Obed Taylor DO, 1 mg at 07/17/24 1845    linezolid (Zyvox)  mg in 300 mL, 600 mg, intravenous, q12h, Obed Taylor DO, Stopped at 07/17/24 1150    ondansetron (Zofran) tablet 4 mg, 4 mg, oral, q6h PRN **OR** ondansetron (Zofran) injection 4 mg, 4 mg, intravenous, q6h PRN, Obed Taylor DO, 4 mg at 07/15/24 1851    piperacillin-tazobactam (Zosyn) 4.5 g in dextrose (iso)  mL, 4.5 g, intravenous, q6h, Obed Taylor DO, Stopped at 07/17/24 2006    polyethylene glycol (Glycolax, Miralax) packet 17 g, 17 g, oral, Daily PRN, Obed Taylor DO, 17 g at 07/17/24 1840    traMADol (Ultram) tablet 50 mg, 50 mg, oral, q4h PRN, Obed Taylor DO, 50 mg at 07/17/24 0323     US right upper quadrant    Result Date: 7/17/2024  Interpreted By:  Geoffrey Rodarte, STUDY: US RIGHT UPPER QUADRANT;  7/16/2024 6:35 pm   INDICATION: Signs/Symptoms:elevated LFT's.   COMPARISON: None.   ACCESSION NUMBER(S): [id number]   ORDERING CLINICIAN: GORDY OLVERA   TECHNIQUE: Multiple images of the right upper quadrant were obtained.  The study was somewhat limited due to the patient's body habitus and overlying bowel gas.   FINDINGS: LIVER: The liver measures 18.4 cm and is diffusely echogenic in appearance, consistent with diffuse fatty infiltration. The resulting increased beam attenuation thereby limiting evaluation of the liver for focal lesions. Within the limitations, no focal lesions are seen.      GALLBLADDER: The gallbladder is nondistended, and demonstrates no evidence of gallstones, wall thickening or surrounding fluid. The gallbladder wall thickness is 0.3 cm. Sonographic Kessler's sign is negative.     BILE DUCTS: No evidence of intra or extrahepatic biliary dilatation is identified; the common bile duct measures 0.6 cm.   PANCREAS: The pancreas is poorly visualized due to overlying bowel gas.   RIGHT KIDNEY: The right kidney measures 10.6 cm in length. The renal cortical echogenicity and thickness are within normal limit.  No hydronephrosis or renal calculi are seen.       Enlarged liver with diffuse fatty infiltration. The pancreas was poorly visualized.   Otherwise, limited, but grossly unremarkable right upper quadrant ultrasound.   MACRO: None   Signed by: Geoffrey Rodarte 7/17/2024 6:12 AM Dictation workstation:   [id number]    CT angio lower extremity right w and or wo IV contrast    Result Date: 7/16/2024  Interpreted By:  Lisa Tristan, STUDY: CT ANGIO LOWER EXTREMITY RIGHT W AND OR WO IV CONTRAST; ;  7/16/2024 8:41 pm   INDICATION: Signs/Symptoms:rule out abcess and necrotizing fasicitis. please do upper thiggh and lower leg..   COMPARISON: Correlation with right femur and tibia fibula radiographs of the same day   ACCESSION NUMBER(S): [id number]   ORDERING CLINICIAN: ANGELA NINO   TECHNIQUE: Thin-section axial postcontrast images of the right lower extremity vasculature from the aortic bifurcation through the toes. Delayed images from the base of the toes were obtained. Images were reconstructed axial, sagittal and coronal planes. 3D and maximum intensity projection images were generated a separate workstation and reviewed   FINDINGS: The right common iliac vein, internal iliac vein, common femoral vein, profunda femoral, femoral vein, popliteal vein, anterior tibial vein, posterior tibial vein, peroneal vein, dorsalis pedis and plantar branches are patent with no significant stenosis.   There  is subcutaneous edema throughout the right lower extremity, mild involving the thigh, moderate around the knee and proximal leg and large amount of edema at the level of the ankle and dorsum of the foot. There is an area of more focal fluid in the anterior mid leg, abutting the deep fascia, measuring approximately 3.5 x 1 x 5 cm. No enhancing capsule.   No soft tissue gas. Intermuscular and intramuscular fat planes are preserved.   There is a multilocular fluid collection abutting the posterior proximal tibiofibular joint, measuring approximately 16 x 6 x 30 mm, the location and appearance is most typical of a capsular ganglion cyst. No knee joint effusion or Baker's cyst.   There is non opacification of a branch of the greater saphenous vein below the level of the knee to the dorsum of the foot (series 606, images 133 through 225). Evaluation of the deep veins is limited on CT, however, no occlusive thrombus is evident on the delayed images, which are obtained from the level of the popliteal vein through the feet.   Mild enlarged right inguinal lymph nodes are likely reactive. Imaged intrapelvic structures are unremarkable.   No osseous destruction or abnormal periosteal bone formation. No acute fracture or malalignment.       Diffuse skin thickening and subcutaneous edema throughout the right lower extremity, most significantly involving the lower leg and ankle. While there is no soft tissue gas, early necrotizing fasciitis cannot be excluded by imaging, and clinical correlation is necessary.   Suspected developing abscess in the mid anterior leg measuring approximately 3.5 x 1 x 5 cm.   Suspected acute thrombophlebitis of a greater saphenous vein branch below the knee.   No acute osseous abnormality.     MACRO: None   Signed by: Lisa Tristan 7/16/2024 10:44 PM Dictation workstation:   [id number]    XR femur right 2+ views    Result Date: 7/15/2024  Interpreted By:  Cynthia Keita, STUDY: XR FEMUR RIGHT 2+  VIEWS; XR TIBIA FIBULA RIGHT 2 VIEWS;  7/15/2024 12:57 pm   INDICATION: Signs/Symptoms:edema erythema cellulitis.   COMPARISON: None.   ACCESSION NUMBER(S): [id number]; [id number]   ORDERING CLINICIAN: BHARATHI MALDONADO   FINDINGS: No acute fracture or osseous destruction. Imaged hip, knee and ankle joints are grossly unremarkable. No knee joint effusion. Diffuse subcutaneous edema more pronounced in the lower leg.       No acute osseous abnormality.   MACRO None   Signed by: Cynthia Keita 7/15/2024 1:17 PM Dictation workstation:   [id number]    XR tibia fibula right 2 views    Result Date: 7/15/2024  Interpreted By:  Cynthia Keita, STUDY: XR FEMUR RIGHT 2+ VIEWS; XR TIBIA FIBULA RIGHT 2 VIEWS;  7/15/2024 12:57 pm   INDICATION: Signs/Symptoms:edema erythema cellulitis.   COMPARISON: None.   ACCESSION NUMBER(S): [id number]; [id number]   ORDERING CLINICIAN: BHARATHI MALDONADO   FINDINGS: No acute fracture or osseous destruction. Imaged hip, knee and ankle joints are grossly unremarkable. No knee joint effusion. Diffuse subcutaneous edema more pronounced in the lower leg.       No acute osseous abnormality.   MACRO None   Signed by: Cynthia Keita 7/15/2024 1:17 PM Dictation workstation:   [id number]    Vascular US lower extremity venous duplex right    Result Date: 7/14/2024  Interpreted By:  Lisa Tristan, STUDY: VASC US LOWER EXTREMITY VENOUS DUPLEX RIGHT;  7/14/2024 12:00 am   INDICATION: Signs/Symptoms:swelling rle.   COMPARISON: None.   ACCESSION NUMBER(S): [id number]   ORDERING CLINICIAN: DONNA STANLEY   TECHNIQUE: Grayscale, color and spectral Doppler sonographic images of the right lower extremity deep venous system. The left common femoral vein was imaged for comparison.   FINDINGS: There is normal compressibility of the right common femoral vein, saphenous femoral junction, femoral vein and popliteal vein. The posterior tibial and peroneal veins demonstrate normal color flow and compressibility.  There is normal spontaneous and phasic variation throughout the leg by spectral doppler.   The left common femoral vein is patent.   OTHER FINDINGS: None.       No sonographic evidence of acute DVT in the right lower extremity.       MACRO: None   Signed by: Lisa Trsitan 7/14/2024 12:52 AM Dictation workstation:   [id number]    XR chest 1 view    Result Date: 7/13/2024  Interpreted By:  Lisa Tristan, STUDY: XR CHEST 1 VIEW;  7/13/2024 10:42 pm   INDICATION: Signs/Symptoms:sob.   COMPARISON: None.   ACCESSION NUMBER(S): [id number]   ORDERING CLINICIAN: DONNA STANLEY   FINDINGS:     CARDIOMEDIASTINAL SILHOUETTE: Cardiomediastinal silhouette is normal in size and configuration.   LUNGS: No pulmonary consolidation, pleural effusion or pneumothorax.   ABDOMEN: No remarkable upper abdominal findings.   BONES: No acute osseous abnormality.       No acute cardiopulmonary process.   MACRO: None   Signed by: Lisa Tristan 7/13/2024 11:19 PM Dictation workstation:   [id number]         Assessment/Plan    #1/Severe right lower extremity cellulitis extending from the foot to ankle to knee to the thigh/CT of the lower extremity yesterday showed diffuse subcutaneous edema and swelling and suspected early abscess and no necrotizing fasciitis or gas and patient underwent I&D by Dr. Taylor this a.m.  Patient remains on IV Dilaudid for severe pain and p.o. tramadol for moderate pain.  Patient wants to eat and will be started on regular diet.  He has no further nausea or abdominal pain.  Patient remains on IV antibiotics Zyvox/clindamycin/Zosyn.  WBC count is improving and is 13.7.  Patient is afebrile.  Continue present treatment plan and ID's and vascular surgery team is also following the patient.  2./Elevated LFTs/acute transaminitis and LFT,s/and AST and ALT are improving and better today at 45 and 66 and total bilirubin is 0.7.  Patient had an ultrasound of the liver and abdomen done yesterday which shows diffuse fatty  liver and hepatomegaly.  Patient will be advised on low-cholesterol diet regular exercise and losing weight.  He will require further workup in the outpatient with GI team and he will be advised to follow-up with GI as an outpatient after he recovers from his cellulitis.  3./DVT prophylaxis./Given that patient has limited mobility due to his right lower leg severe cellulitis patient will be started on subcu Lovenox 40 mg daily.  Increase ambulation as tolerated after clarified with vascular surgery.  Medications reconciled.  Repeat labs in AM.  Case fully discussed and all his questions answered and also case discussed with his mother who is present in the room.  Total time spent 35 minutes/time spent on patient interaction/counseling/assessment and plan and documentation.              Principal Problem:    Cellulitis of right lower extremity  Active Problems:    Abscess of right lower extremity                  Ralf Jackson MD

## 2024-07-18 LAB
ALBUMIN SERPL BCP-MCNC: 3.3 G/DL (ref 3.4–5)
ALP SERPL-CCNC: 80 U/L (ref 33–120)
ALT SERPL W P-5'-P-CCNC: 45 U/L (ref 10–52)
ANION GAP SERPL CALC-SCNC: 12 MMOL/L (ref 10–20)
AST SERPL W P-5'-P-CCNC: 25 U/L (ref 9–39)
BACTERIA BLD CULT: NORMAL
BACTERIA BLD CULT: NORMAL
BACTERIA SPEC CULT: NORMAL
BILIRUB SERPL-MCNC: 0.5 MG/DL (ref 0–1.2)
BUN SERPL-MCNC: 9 MG/DL (ref 6–23)
CALCIUM SERPL-MCNC: 8.6 MG/DL (ref 8.6–10.3)
CHLORIDE SERPL-SCNC: 98 MMOL/L (ref 98–107)
CO2 SERPL-SCNC: 27 MMOL/L (ref 21–32)
CREAT SERPL-MCNC: 0.94 MG/DL (ref 0.5–1.3)
EGFRCR SERPLBLD CKD-EPI 2021: >90 ML/MIN/1.73M*2
ERYTHROCYTE [DISTWIDTH] IN BLOOD BY AUTOMATED COUNT: 12.6 % (ref 11.5–14.5)
GLUCOSE SERPL-MCNC: 143 MG/DL (ref 74–99)
GRAM STN SPEC: NORMAL
GRAM STN SPEC: NORMAL
HCT VFR BLD AUTO: 34.9 % (ref 41–52)
HGB BLD-MCNC: 12.3 G/DL (ref 13.5–17.5)
HOLD SPECIMEN: NORMAL
MCH RBC QN AUTO: 31.6 PG (ref 26–34)
MCHC RBC AUTO-ENTMCNC: 35.2 G/DL (ref 32–36)
MCV RBC AUTO: 90 FL (ref 80–100)
NRBC BLD-RTO: 0 /100 WBCS (ref 0–0)
PLATELET # BLD AUTO: 178 X10*3/UL (ref 150–450)
POTASSIUM SERPL-SCNC: 4.1 MMOL/L (ref 3.5–5.3)
PROT SERPL-MCNC: 6.1 G/DL (ref 6.4–8.2)
RBC # BLD AUTO: 3.89 X10*6/UL (ref 4.5–5.9)
SODIUM SERPL-SCNC: 133 MMOL/L (ref 136–145)
WBC # BLD AUTO: 9.3 X10*3/UL (ref 4.4–11.3)

## 2024-07-18 PROCEDURE — 99024 POSTOP FOLLOW-UP VISIT: CPT | Performed by: NURSE PRACTITIONER

## 2024-07-18 PROCEDURE — 2500000004 HC RX 250 GENERAL PHARMACY W/ HCPCS (ALT 636 FOR OP/ED): Mod: JZ | Performed by: SURGERY

## 2024-07-18 PROCEDURE — 2500000001 HC RX 250 WO HCPCS SELF ADMINISTERED DRUGS (ALT 637 FOR MEDICARE OP): Performed by: SURGERY

## 2024-07-18 PROCEDURE — 2500000004 HC RX 250 GENERAL PHARMACY W/ HCPCS (ALT 636 FOR OP/ED): Performed by: SURGERY

## 2024-07-18 PROCEDURE — 85027 COMPLETE CBC AUTOMATED: CPT | Performed by: INTERNAL MEDICINE

## 2024-07-18 PROCEDURE — 1100000001 HC PRIVATE ROOM DAILY

## 2024-07-18 PROCEDURE — 2500000004 HC RX 250 GENERAL PHARMACY W/ HCPCS (ALT 636 FOR OP/ED): Performed by: INTERNAL MEDICINE

## 2024-07-18 PROCEDURE — 36415 COLL VENOUS BLD VENIPUNCTURE: CPT | Performed by: INTERNAL MEDICINE

## 2024-07-18 PROCEDURE — 84075 ASSAY ALKALINE PHOSPHATASE: CPT | Performed by: INTERNAL MEDICINE

## 2024-07-18 ASSESSMENT — COGNITIVE AND FUNCTIONAL STATUS - GENERAL
MOBILITY SCORE: 24
MOBILITY SCORE: 24
DAILY ACTIVITIY SCORE: 24
DAILY ACTIVITIY SCORE: 24

## 2024-07-18 ASSESSMENT — PAIN SCALES - GENERAL
PAINLEVEL_OUTOF10: 8
PAINLEVEL_OUTOF10: 6
PAINLEVEL_OUTOF10: 7
PAINLEVEL_OUTOF10: 4
PAINLEVEL_OUTOF10: 7
PAINLEVEL_OUTOF10: 7
PAINLEVEL_OUTOF10: 4
PAINLEVEL_OUTOF10: 4
PAINLEVEL_OUTOF10: 8
PAINLEVEL_OUTOF10: 5 - MODERATE PAIN
PAINLEVEL_OUTOF10: 9
PAINLEVEL_OUTOF10: 4
PAINLEVEL_OUTOF10: 4
PAINLEVEL_OUTOF10: 8
PAINLEVEL_OUTOF10: 7
PAINLEVEL_OUTOF10: 8

## 2024-07-18 ASSESSMENT — PAIN DESCRIPTION - LOCATION
LOCATION: LEG

## 2024-07-18 ASSESSMENT — PAIN DESCRIPTION - ORIENTATION
ORIENTATION: RIGHT

## 2024-07-18 ASSESSMENT — PAIN SCALES - PAIN ASSESSMENT IN ADVANCED DEMENTIA (PAINAD)
TOTALSCORE: MEDICATION (SEE MAR)

## 2024-07-18 ASSESSMENT — PAIN DESCRIPTION - DESCRIPTORS: DESCRIPTORS: THROBBING

## 2024-07-18 NOTE — PROGRESS NOTES
Mac Perez is a 28 y.o. male on day 3 of admission presenting with Cellulitis of right lower extremity.      Subjective   Patient seen and examined by me.  Patient is resting in bed and states he still has mild to moderate pain and wants another IV Dilaudid this evening and after that he states it can be discontinued and he will manage with p.o. tramadol overnight.  Patient is awake alert oriented x 3.  His right leg swelling and right thigh swelling is much improving.  Patient is being followed by Dr. Taylor/vascular surgery s/p I&D of the abscess from his right calf.  Patient remains on IV Zyvox and IV Zosyn.  Vitals and labs noted.  Patient is afebrile.  His LFTs have resolved and improved.       Objective     Last Recorded Vitals  /58 (BP Location: Right arm, Patient Position: Lying)   Pulse 62   Temp 36.7 °C (98.1 °F) (Temporal)   Resp 16   Wt 86.2 kg (190 lb)   SpO2 97%   Intake/Output last 3 Shifts:    Intake/Output Summary (Last 24 hours) at 7/18/2024 1907  Last data filed at 7/18/2024 1712  Gross per 24 hour   Intake 1540 ml   Output 1900 ml   Net -360 ml       Admission Weight  Weight: 86.2 kg (190 lb) (07/15/24 1127)    Daily Weight  07/17/24 : 86.2 kg (190 lb)    Image Results  US right upper quadrant  Narrative: Interpreted By:  Geoffrey Rodarte,   STUDY:  US RIGHT UPPER QUADRANT;  7/16/2024 6:35 pm      INDICATION:  Signs/Symptoms:elevated LFT's.      COMPARISON:  None.      ACCESSION NUMBER(S):  [id number]      ORDERING CLINICIAN:  GORDY OLVERA      TECHNIQUE:  Multiple images of the right upper quadrant were obtained.  The study  was somewhat limited due to the patient's body habitus and overlying  bowel gas.      FINDINGS:  LIVER:  The liver measures 18.4 cm and is diffusely echogenic in appearance,  consistent with diffuse fatty infiltration. The resulting increased  beam attenuation thereby limiting evaluation of the liver for focal  lesions. Within the limitations, no focal  lesions are seen.          GALLBLADDER:  The gallbladder is nondistended, and demonstrates no evidence of  gallstones, wall thickening or surrounding fluid. The gallbladder  wall thickness is 0.3 cm. Sonographic Kessler's sign is negative.          BILE DUCTS:  No evidence of intra or extrahepatic biliary dilatation is  identified; the common bile duct measures 0.6 cm.      PANCREAS:  The pancreas is poorly visualized due to overlying bowel gas.      RIGHT KIDNEY:  The right kidney measures 10.6 cm in length. The renal cortical  echogenicity and thickness are within normal limit.  No  hydronephrosis or renal calculi are seen.      Impression: Enlarged liver with diffuse fatty infiltration. The pancreas was  poorly visualized.      Otherwise, limited, but grossly unremarkable right upper quadrant  ultrasound.      MACRO:  None      Signed by: Geoffrey Rodarte 7/17/2024 6:12 AM  Dictation workstation:   [id number]      Physical Exam  Vitals and nursing note reviewed.   Constitutional:       General: He is not in acute distress.     Appearance: Normal appearance. He is normal weight. He is not ill-appearing or toxic-appearing.   HENT:      Head: Normocephalic and atraumatic.      Right Ear: Tympanic membrane and ear canal normal. There is no impacted cerumen.      Left Ear: Tympanic membrane, ear canal and external ear normal.      Nose: Nose normal. No congestion or rhinorrhea.      Mouth/Throat:      Pharynx: Oropharynx is clear. No oropharyngeal exudate or posterior oropharyngeal erythema.   Eyes:      General: No scleral icterus.        Right eye: No discharge.         Left eye: No discharge.      Extraocular Movements: Extraocular movements intact.      Conjunctiva/sclera: Conjunctivae normal.      Pupils: Pupils are equal, round, and reactive to light.   Neck:      Vascular: No carotid bruit.   Cardiovascular:      Rate and Rhythm: Normal rate and regular rhythm.      Pulses: Normal pulses.      Heart sounds: Normal  heart sounds. No murmur heard.     No gallop.   Pulmonary:      Effort: Pulmonary effort is normal. No respiratory distress.      Breath sounds: Normal breath sounds. No wheezing, rhonchi or rales.   Abdominal:      General: Abdomen is flat. Bowel sounds are normal. There is no distension.      Palpations: Abdomen is soft. There is no mass.      Tenderness: There is no abdominal tenderness. There is no right CVA tenderness, left CVA tenderness, guarding or rebound.      Hernia: No hernia is present.   Musculoskeletal:         General: No swelling. Normal range of motion.      Cervical back: Normal range of motion and neck supple. No rigidity.      Left lower leg: Tenderness present. No edema.      Comments: Right thigh and leg and knee swelling and erythema is much better.  Patient is s/p I&D of the right calf abscess.   Lymphadenopathy:      Cervical: No cervical adenopathy.   Skin:     General: Skin is warm.      Coloration: Skin is not jaundiced.      Findings: No erythema or rash.   Neurological:      General: No focal deficit present.      Mental Status: He is alert and oriented to person, place, and time. Mental status is at baseline.      Sensory: No sensory deficit.      Motor: No weakness.      Gait: Gait normal.      Deep Tendon Reflexes: Reflexes normal.   Psychiatric:         Mood and Affect: Mood normal.         Thought Content: Thought content normal.         Judgment: Judgment normal.         Relevant Results      Current Facility-Administered Medications:     acetaminophen (Tylenol) tablet 650 mg, 650 mg, oral, q4h PRN, 650 mg at 07/16/24 1736 **OR** acetaminophen (Tylenol) oral liquid 650 mg, 650 mg, oral, q4h PRN, Obed Taylor DO    enoxaparin (Lovenox) syringe 40 mg, 40 mg, subcutaneous, Daily, Ralf Jackson MD, 40 mg at 07/18/24 0802    HYDROmorphone (Dilaudid) injection 1 mg, 1 mg, intravenous, q4h PRN, Obed Taylor DO, 1 mg at 07/18/24 2015    linezolid (Zyvox)  mg in 300 mL, 600  mg, intravenous, q12h, Obed Taylor DO, Stopped at 07/18/24 1143    ondansetron (Zofran) tablet 4 mg, 4 mg, oral, q6h PRN **OR** ondansetron (Zofran) injection 4 mg, 4 mg, intravenous, q6h PRN, Obed Taylor DO, 4 mg at 07/15/24 1851    piperacillin-tazobactam (Zosyn) 4.5 g in dextrose (iso)  mL, 4.5 g, intravenous, q6h, Obed Taylor DO, Stopped at 07/18/24 1742    polyethylene glycol (Glycolax, Miralax) packet 17 g, 17 g, oral, Daily PRN, Obed Taylor DO, 17 g at 07/18/24 0546    traMADol (Ultram) tablet 50 mg, 50 mg, oral, q4h PRN, Obed Taylor DO, 50 mg at 07/18/24 1730   Results for orders placed or performed during the hospital encounter of 07/15/24 (from the past 24 hour(s))   CBC   Result Value Ref Range    WBC 9.3 4.4 - 11.3 x10*3/uL    nRBC 0.0 0.0 - 0.0 /100 WBCs    RBC 3.89 (L) 4.50 - 5.90 x10*6/uL    Hemoglobin 12.3 (L) 13.5 - 17.5 g/dL    Hematocrit 34.9 (L) 41.0 - 52.0 %    MCV 90 80 - 100 fL    MCH 31.6 26.0 - 34.0 pg    MCHC 35.2 32.0 - 36.0 g/dL    RDW 12.6 11.5 - 14.5 %    Platelets 178 150 - 450 x10*3/uL   Comprehensive Metabolic Panel   Result Value Ref Range    Glucose 143 (H) 74 - 99 mg/dL    Sodium 133 (L) 136 - 145 mmol/L    Potassium 4.1 3.5 - 5.3 mmol/L    Chloride 98 98 - 107 mmol/L    Bicarbonate 27 21 - 32 mmol/L    Anion Gap 12 10 - 20 mmol/L    Urea Nitrogen 9 6 - 23 mg/dL    Creatinine 0.94 0.50 - 1.30 mg/dL    eGFR >90 >60 mL/min/1.73m*2    Calcium 8.6 8.6 - 10.3 mg/dL    Albumin 3.3 (L) 3.4 - 5.0 g/dL    Alkaline Phosphatase 80 33 - 120 U/L    Total Protein 6.1 (L) 6.4 - 8.2 g/dL    AST 25 9 - 39 U/L    Bilirubin, Total 0.5 0.0 - 1.2 mg/dL    ALT 45 10 - 52 U/L   SST TOP   Result Value Ref Range    Extra Tube Hold for add-ons.     US right upper quadrant    Result Date: 7/17/2024  Interpreted By:  Geoffrey Rodarte, STUDY: US RIGHT UPPER QUADRANT;  7/16/2024 6:35 pm   INDICATION: Signs/Symptoms:elevated LFT's.   COMPARISON: None.   ACCESSION NUMBER(S):  [id number]   ORDERING CLINICIAN: GORDY OLVERA   TECHNIQUE: Multiple images of the right upper quadrant were obtained.  The study was somewhat limited due to the patient's body habitus and overlying bowel gas.   FINDINGS: LIVER: The liver measures 18.4 cm and is diffusely echogenic in appearance, consistent with diffuse fatty infiltration. The resulting increased beam attenuation thereby limiting evaluation of the liver for focal lesions. Within the limitations, no focal lesions are seen.     GALLBLADDER: The gallbladder is nondistended, and demonstrates no evidence of gallstones, wall thickening or surrounding fluid. The gallbladder wall thickness is 0.3 cm. Sonographic Kessler's sign is negative.     BILE DUCTS: No evidence of intra or extrahepatic biliary dilatation is identified; the common bile duct measures 0.6 cm.   PANCREAS: The pancreas is poorly visualized due to overlying bowel gas.   RIGHT KIDNEY: The right kidney measures 10.6 cm in length. The renal cortical echogenicity and thickness are within normal limit.  No hydronephrosis or renal calculi are seen.       Enlarged liver with diffuse fatty infiltration. The pancreas was poorly visualized.   Otherwise, limited, but grossly unremarkable right upper quadrant ultrasound.   MACRO: None   Signed by: Geoffrey Rodarte 7/17/2024 6:12 AM Dictation workstation:   [id number]    CT angio lower extremity right w and or wo IV contrast    Result Date: 7/16/2024  Interpreted By:  Lisa Tristan, STUDY: CT ANGIO LOWER EXTREMITY RIGHT W AND OR WO IV CONTRAST; ;  7/16/2024 8:41 pm   INDICATION: Signs/Symptoms:rule out abcess and necrotizing fasicitis. please do upper thiggh and lower leg..   COMPARISON: Correlation with right femur and tibia fibula radiographs of the same day   ACCESSION NUMBER(S): [id number]   ORDERING CLINICIAN: ANGELA NINO   TECHNIQUE: Thin-section axial postcontrast images of the right lower extremity vasculature from the aortic bifurcation  through the toes. Delayed images from the base of the toes were obtained. Images were reconstructed axial, sagittal and coronal planes. 3D and maximum intensity projection images were generated a separate workstation and reviewed   FINDINGS: The right common iliac vein, internal iliac vein, common femoral vein, profunda femoral, femoral vein, popliteal vein, anterior tibial vein, posterior tibial vein, peroneal vein, dorsalis pedis and plantar branches are patent with no significant stenosis.   There is subcutaneous edema throughout the right lower extremity, mild involving the thigh, moderate around the knee and proximal leg and large amount of edema at the level of the ankle and dorsum of the foot. There is an area of more focal fluid in the anterior mid leg, abutting the deep fascia, measuring approximately 3.5 x 1 x 5 cm. No enhancing capsule.   No soft tissue gas. Intermuscular and intramuscular fat planes are preserved.   There is a multilocular fluid collection abutting the posterior proximal tibiofibular joint, measuring approximately 16 x 6 x 30 mm, the location and appearance is most typical of a capsular ganglion cyst. No knee joint effusion or Baker's cyst.   There is non opacification of a branch of the greater saphenous vein below the level of the knee to the dorsum of the foot (series 606, images 133 through 225). Evaluation of the deep veins is limited on CT, however, no occlusive thrombus is evident on the delayed images, which are obtained from the level of the popliteal vein through the feet.   Mild enlarged right inguinal lymph nodes are likely reactive. Imaged intrapelvic structures are unremarkable.   No osseous destruction or abnormal periosteal bone formation. No acute fracture or malalignment.       Diffuse skin thickening and subcutaneous edema throughout the right lower extremity, most significantly involving the lower leg and ankle. While there is no soft tissue gas, early necrotizing  fasciitis cannot be excluded by imaging, and clinical correlation is necessary.   Suspected developing abscess in the mid anterior leg measuring approximately 3.5 x 1 x 5 cm.   Suspected acute thrombophlebitis of a greater saphenous vein branch below the knee.   No acute osseous abnormality.     MACRO: None   Signed by: Lisa Tristan 7/16/2024 10:44 PM Dictation workstation:   [id number]    XR femur right 2+ views    Result Date: 7/15/2024  Interpreted By:  Cynthia Keita, STUDY: XR FEMUR RIGHT 2+ VIEWS; XR TIBIA FIBULA RIGHT 2 VIEWS;  7/15/2024 12:57 pm   INDICATION: Signs/Symptoms:edema erythema cellulitis.   COMPARISON: None.   ACCESSION NUMBER(S): [id number]; [id number]   ORDERING CLINICIAN: BHARATHI MALDONADO   FINDINGS: No acute fracture or osseous destruction. Imaged hip, knee and ankle joints are grossly unremarkable. No knee joint effusion. Diffuse subcutaneous edema more pronounced in the lower leg.       No acute osseous abnormality.   MACRO None   Signed by: Cynthia Keita 7/15/2024 1:17 PM Dictation workstation:   [id number]    XR tibia fibula right 2 views    Result Date: 7/15/2024  Interpreted By:  Cynthia Keita, STUDY: XR FEMUR RIGHT 2+ VIEWS; XR TIBIA FIBULA RIGHT 2 VIEWS;  7/15/2024 12:57 pm   INDICATION: Signs/Symptoms:edema erythema cellulitis.   COMPARISON: None.   ACCESSION NUMBER(S): [id number]; [id number]   ORDERING CLINICIAN: BHARATHI MALDONADO   FINDINGS: No acute fracture or osseous destruction. Imaged hip, knee and ankle joints are grossly unremarkable. No knee joint effusion. Diffuse subcutaneous edema more pronounced in the lower leg.       No acute osseous abnormality.   MACRO None   Signed by: Cynthia Keita 7/15/2024 1:17 PM Dictation workstation:   [id number]    Vascular US lower extremity venous duplex right    Result Date: 7/14/2024  Interpreted By:  Lisa Tristan, STUDY: VASC US LOWER EXTREMITY VENOUS DUPLEX RIGHT;  7/14/2024 12:00 am   INDICATION:  Signs/Symptoms:swelling rle.   COMPARISON: None.   ACCESSION NUMBER(S): [id number]   ORDERING CLINICIAN: DONNA STANLEY   TECHNIQUE: Grayscale, color and spectral Doppler sonographic images of the right lower extremity deep venous system. The left common femoral vein was imaged for comparison.   FINDINGS: There is normal compressibility of the right common femoral vein, saphenous femoral junction, femoral vein and popliteal vein. The posterior tibial and peroneal veins demonstrate normal color flow and compressibility. There is normal spontaneous and phasic variation throughout the leg by spectral doppler.   The left common femoral vein is patent.   OTHER FINDINGS: None.       No sonographic evidence of acute DVT in the right lower extremity.       MACRO: None   Signed by: Lisa Tristan 7/14/2024 12:52 AM Dictation workstation:   [id number]    XR chest 1 view    Result Date: 7/13/2024  Interpreted By:  Lisa Tristan, STUDY: XR CHEST 1 VIEW;  7/13/2024 10:42 pm   INDICATION: Signs/Symptoms:sob.   COMPARISON: None.   ACCESSION NUMBER(S): [id number]   ORDERING CLINICIAN: DONNA STANLEY   FINDINGS:     CARDIOMEDIASTINAL SILHOUETTE: Cardiomediastinal silhouette is normal in size and configuration.   LUNGS: No pulmonary consolidation, pleural effusion or pneumothorax.   ABDOMEN: No remarkable upper abdominal findings.   BONES: No acute osseous abnormality.       No acute cardiopulmonary process.   MACRO: None   Signed by: Lisa Tristan 7/13/2024 11:19 PM Dictation workstation:   [id number]         Assessment/Plan    #1/Right leg and thigh severe cellulitis/s/p poison ivy rash/right calf abscess which required I&D by vascular surgery/patient remains on IV Zosyn and IV Zyvox.  His leukocytosis has improved.  Patient is afebrile.  Right leg swelling and erythema is improving.  Possible discharge in the next 24 to 48 hours once cleared by vascular surgery and ID decides on p.o. antibiotics.  Patient is ambulating on  his right leg and states that his IV Dilaudid can be discontinued after this evening's dose and he will try to manage with p.o. tramadol and still has moderate pain.  Clinically patient is doing much better.  #2/acute transaminitis and elevated LFTs which have resolved.  Ultrasound of the abdomen showed fatty liver.  Patient has been advised on low-cholesterol diet and losing weight.  3./DVT prophylaxis patient remains on subcu Lovenox for DVT prophylaxis.  Possible discharge in the next 24 to 48 hours.  Further p.o. antibiotics as per ID.  As per vascular surgery patient will follow in the wound center with Dr. Taylor as per his instructions on discharge.  Labs and vitals noted and reviewed.  Consultation notes reviewed and noted.  Medications reconciled.  Total time spent 20 minutes/time spent on patient interaction/counseling/assessment and plan and documentation.          Principal Problem:    Cellulitis of right lower extremity  Active Problems:    Abscess of right lower extremity                  Ralf Jackson MD

## 2024-07-18 NOTE — CARE PLAN
Problem: Pain  Goal: Takes deep breaths with improved pain control throughout the shift  Outcome: Progressing  Goal: Turns in bed with improved pain control throughout the shift  Outcome: Progressing  Goal: Walks with improved pain control throughout the shift  Outcome: Progressing  Goal: Performs ADL's with improved pain control throughout shift  Outcome: Progressing  Goal: Participates in PT with improved pain control throughout the shift  Outcome: Progressing  Goal: Free from opioid side effects throughout the shift  Outcome: Progressing  Goal: Free from acute confusion related to pain meds throughout the shift  Outcome: Progressing     Problem: Skin  Goal: Decreased wound size/increased tissue granulation at next dressing change  Outcome: Progressing  Goal: Participates in plan/prevention/treatment measures  Outcome: Progressing  Goal: Prevent/manage excess moisture  Outcome: Progressing  Goal: Prevent/minimize sheer/friction injuries  Outcome: Progressing  Goal: Promote/optimize nutrition  Outcome: Progressing  Goal: Promote skin healing  Outcome: Progressing   The patient's goals for the shift include      The clinical goals for the shift include pain management & antibiotics

## 2024-07-18 NOTE — OP NOTE
INCISION & DRAINAGE OF RIGHT LEG ABSCESS (R) Operative Note     Date: 2024  OR Location: [facility]    Name: Mac Perez, : 1995, Age: 28 y.o., MRN: [id number], Sex: male    Diagnosis  Pre-op Diagnosis      * Abscess of right lower extremity [L02.415] Post-op Diagnosis     * Abscess of right lower extremity [L02.415]     Procedures  INCISION & DRAINAGE OF RIGHT LEG ABSCESS  89198 - [place] INCISION & DRAINAGE ABSCESS SIMPLE/SINGLE      Surgeons      * Obed Taylor - Primary    Resident/Fellow/Other Assistant:  Surgeons and Role:  * No surgeons found with a matching role *    Procedure Summary  Anesthesia: General, Monitor Anesthesia Care  ASA: II  Anesthesia Staff: Anesthesiologist: Mamadou Melgoza MD  CRNA: MEHUL Sanchez-CRNA; MEHUL Holt-CRNA  Estimated Blood Loss: 25mL  Intra-op Medications:   Administrations occurring from 1430 to 1518 on 24:   Medication Name Total Dose   piperacillin-tazobactam (Zosyn) 4.5 g in dextrose (iso)  mL Cannot be calculated              Anesthesia Record               Intraprocedure I/O Totals          Intake    LR bolus 600.00 mL    Total Intake 600 mL       Output    Est. Blood Loss 15 mL    Total Output 15 mL       Net    Net Volume 585 mL          Specimen:   ID Type Source Tests Collected by Time   A : Right lower leg culture Swab ABSCESS TISSUE/WOUND CULTURE/SMEAR Obed M DO [place] 2024 1516        Staff:   Toshaulator: Maggie  Circulator: Alyssa Milan Person: Adri Moyer Scrub: Kwan         Drains and/or Catheters: * None in log *    Tourniquet Times:         Implants:     Findings:     Indications: Mac Perez is an 28 y.o. male who is having surgery for Abscess of right lower extremity [L02.415].     The patient was seen in the preoperative area. The risks, benefits, complications, treatment options, non-operative alternatives, expected recovery and outcomes were discussed with the patient. The possibilities of reaction to  medication, pulmonary aspiration, injury to surrounding structures, bleeding, recurrent infection, the need for additional procedures, failure to diagnose a condition, and creating a complication requiring transfusion or operation were discussed with the patient. The patient concurred with the proposed plan, giving informed consent.  The site of surgery was properly noted/marked if necessary per policy. The patient has been actively warmed in preoperative area. Preoperative antibiotics have been ordered and given within 1 hours of incision. Venous thrombosis prophylaxis have been ordered including unilateral sequential compression device    Procedure Details: After the procedure was fully explained to the patient after adequate consent form was signed, the patient had an IV and received preoperative IV antibiotics.  The patient was then taken operating room placed in the supine position and given general acetic agent had LMA for ventilation.  The entire right lower extremity from the mid thigh down to the toes was cleaned and prepped in usual sterile fashion.  Using ultrasound, a suspected abscess was identified in the anterior medial aspect of the mid tibial region.  This was also seen on CAT scan.  The skin subcutaneous tissue was anesthetized with lidocaine.  A 5 cm longitudinal incision was made over the the area.  Dissection was carried down through subcutaneous tissue and down to the fascia.  The fascia appeared to be intact without any gross signs of infection.  The subcutaneous tissue was very edematous and cultures were taken.  There was no lester pus identified.  Bleeding was controlled using cautery.  A small longitudinal incision was made through the fascia.  The muscle beneath the fascia appeared to be normal.  The wound was then thoroughly debrided with Misonix device. At this time, the wound was copiously irrigated with antibiotic solution.  The fascial defect was closed using 4-0 Monocryl.  Near the  ends of the incision, 3-0 Prolene vertical mattress sutures were placed.  The center of the incision was then packed using half-inch Nu Gauze.  4 x 4's Curlex Ace bandage was applied.  The patient tolerated the procedure well.  No complications noted.  Instrument count needle count sponge counts correct.  Thank you very much Discenza dictation  Complications:  None; patient tolerated the procedure well.    Disposition: PACU - hemodynamically stable.  Condition: stable         Additional Details:     Attending Attestation: I was present and scrubbed for the entire procedure.    Obed Taylor  Phone Number: [phone]

## 2024-07-18 NOTE — PROGRESS NOTES
"Infectious disease progress note  Subjective   Severe cellulitis right leg with abscess     Antibiotics  Zosyn day 4  Zyvox day 3  Clinda day 2 will discontinue    Objective   Range of Vitals (last 24 hours)  Heart Rate:  [53-86]   Temp:  [35.4 °C (95.7 °F)-36.9 °C (98.4 °F)]   Resp:  [16-18]   BP: (112-123)/(56-73)   Height:  [170.2 cm (5' 7\")]   Weight:  [86.2 kg (190 lb)]   SpO2:  [92 %-100 %]   Daily Weight  07/17/24 : 86.2 kg (190 lb)    Body mass index is 29.76 kg/m².      Physical Exam  The leg overall looks much better with the redness is receding nicely.  There is an incision that is packed there is good skin wrinkling and the redness is improving      Relevant Results  Labs  Lab Results   Component Value Date    WBC 9.3 07/18/2024    HGB 12.3 (L) 07/18/2024    HCT 34.9 (L) 07/18/2024    MCV 90 07/18/2024     07/18/2024     Lab Results   Component Value Date    GLUCOSE 143 (H) 07/18/2024    CALCIUM 8.6 07/18/2024     (L) 07/18/2024    K 4.1 07/18/2024    CO2 27 07/18/2024    CL 98 07/18/2024    BUN 9 07/18/2024    CREATININE 0.94 07/18/2024   ESR: --  Lab Results   Component Value Date    SEDRATE 8 07/15/2024     Lab Results   Component Value Date    CRP 29.92 (H) 07/15/2024     Lab Results   Component Value Date    ALT 45 07/18/2024    AST 25 07/18/2024    ALKPHOS 80 07/18/2024    BILITOT 0.5 07/18/2024       Microbiology  7- blood cultures no growth at 2 days  7- blood cultures no growth at 1 day  7- wound cultures are pending    Imaging  7- CT of right leg shows a developing abscess in the mid anterior leg measuring 3.5 x 1 x 5 cm.  Also early necrotizing fasciitis cannot be excluded     Assessment/Plan   1.  Continue Zosyn and Zyvox for now will discontinue clindamycin since there was no evidence of necrotizing fasciitis and the leg is improving greatly  2.  We will follow-up the cultures and readjust antibiotics as needed.  If nothing grows which is probably " going to be the case since patient was on antibiotics for at least 72 hours prior to his surgery then I will empirically treat him for strep or staph infection with Zyvox    Other issues  No past medical history    I reviewed and interpreted all lab test imaging studies and documentations from other healthcare providers  I am monitoring for antibiotic side effects and toxicity     Wendy Arnett MD

## 2024-07-18 NOTE — CARE PLAN
Problem: Pain  Goal: Takes deep breaths with improved pain control throughout the shift  Outcome: Progressing  Goal: Turns in bed with improved pain control throughout the shift  Outcome: Progressing  Goal: Walks with improved pain control throughout the shift  Outcome: Progressing  Goal: Performs ADL's with improved pain control throughout shift  Outcome: Progressing  Goal: Participates in PT with improved pain control throughout the shift  Outcome: Progressing  Goal: Free from opioid side effects throughout the shift  Outcome: Progressing  Goal: Free from acute confusion related to pain meds throughout the shift  Outcome: Progressing     Problem: Skin  Goal: Decreased wound size/increased tissue granulation at next dressing change  7/18/2024 0911 by Negin Bustillo RN  Flowsheets (Taken 7/18/2024 0911)  Decreased wound size/increased tissue granulation at next dressing change: Promote sleep for wound healing  7/18/2024 0911 by Negin Bustillo RN  Outcome: Progressing  Flowsheets (Taken 7/18/2024 0911)  Decreased wound size/increased tissue granulation at next dressing change: Promote sleep for wound healing  Goal: Participates in plan/prevention/treatment measures  7/18/2024 0911 by Negin Bustillo RN  Flowsheets (Taken 7/18/2024 0911)  Participates in plan/prevention/treatment measures: Elevate heels  7/18/2024 0911 by Negin Bustillo RN  Outcome: Progressing  Flowsheets (Taken 7/18/2024 0911)  Participates in plan/prevention/treatment measures: Elevate heels  Goal: Prevent/manage excess moisture  7/18/2024 0911 by Negin Bustillo RN  Flowsheets (Taken 7/18/2024 0911)  Prevent/manage excess moisture:   Monitor for/manage infection if present   Moisturize dry skin  7/18/2024 0911 by Negin Bustillo RN  Outcome: Progressing  Flowsheets (Taken 7/18/2024 0911)  Prevent/manage excess moisture:   Monitor for/manage infection if present   Moisturize dry skin  Goal: Prevent/minimize sheer/friction  injuries  7/18/2024 0911 by Negin Bustillo RN  Flowsheets (Taken 7/18/2024 0911)  Prevent/minimize sheer/friction injuries: HOB 30 degrees or less  7/18/2024 0911 by Negin Bustillo RN  Outcome: Progressing  Flowsheets (Taken 7/18/2024 0911)  Prevent/minimize sheer/friction injuries: HOB 30 degrees or less  Goal: Promote/optimize nutrition  7/18/2024 0911 by Negin Bustillo RN  Flowsheets (Taken 7/18/2024 0911)  Promote/optimize nutrition: Consume > 50% meals/supplements  7/18/2024 0911 by Negin Bustillo RN  Outcome: Progressing  Flowsheets (Taken 7/18/2024 0911)  Promote/optimize nutrition: Consume > 50% meals/supplements  Goal: Promote skin healing  7/18/2024 0911 by Negin Bustillo RN  Flowsheets (Taken 7/18/2024 0911)  Promote skin healing: Assess skin/pad under line(s)/device(s)  7/18/2024 0911 by Negin Bustillo RN  Outcome: Progressing  Flowsheets (Taken 7/18/2024 0911)  Promote skin healing: Assess skin/pad under line(s)/device(s)     Problem: Pain - Adult  Goal: Verbalizes/displays adequate comfort level or baseline comfort level  Outcome: Progressing     Problem: Safety - Adult  Goal: Free from fall injury  Outcome: Progressing     Problem: Discharge Planning  Goal: Discharge to home or other facility with appropriate resources  Outcome: Progressing     Problem: Chronic Conditions and Co-morbidities  Goal: Patient's chronic conditions and co-morbidity symptoms are monitored and maintained or improved  Outcome: Progressing   The patient's goals for the shift include      The clinical goals for the shift include pain management & antibiotics

## 2024-07-18 NOTE — CARE PLAN
Problem: Pain  Goal: Takes deep breaths with improved pain control throughout the shift  Outcome: Progressing  Goal: Turns in bed with improved pain control throughout the shift  Outcome: Progressing  Goal: Walks with improved pain control throughout the shift  Outcome: Progressing  Goal: Performs ADL's with improved pain control throughout shift  Outcome: Progressing  Goal: Participates in PT with improved pain control throughout the shift  Outcome: Progressing  Goal: Free from opioid side effects throughout the shift  Outcome: Progressing  Goal: Free from acute confusion related to pain meds throughout the shift  Outcome: Progressing     Problem: Skin  Goal: Decreased wound size/increased tissue granulation at next dressing change  Outcome: Progressing  Goal: Participates in plan/prevention/treatment measures  Outcome: Progressing  Goal: Prevent/manage excess moisture  Outcome: Progressing  Goal: Prevent/minimize sheer/friction injuries  Outcome: Progressing  Goal: Promote/optimize nutrition  Outcome: Progressing  Goal: Promote skin healing  Outcome: Progressing     Problem: Pain - Adult  Goal: Verbalizes/displays adequate comfort level or baseline comfort level  Outcome: Progressing     Problem: Safety - Adult  Goal: Free from fall injury  Outcome: Progressing     Problem: Discharge Planning  Goal: Discharge to home or other facility with appropriate resources  Outcome: Progressing     Problem: Chronic Conditions and Co-morbidities  Goal: Patient's chronic conditions and co-morbidity symptoms are monitored and maintained or improved  Outcome: Progressing   The patient's goals for the shift include      The clinical goals for the shift include dressing changes, infection prevention with antibiotics, & pain management

## 2024-07-18 NOTE — DISCHARGE INSTRUCTIONS
Remove packing from wound with each dressing change and irrigate wound well with normal saline or Vashe.  Then place half-inch Nu Gauze to pack the wound with each dressing change.  Then cover with 4 x 4's Curlex and an Ace bandage every 8 hours

## 2024-07-18 NOTE — PROGRESS NOTES
"Mac Perez is a 28 y.o. male on day 3 of admission presenting with Cellulitis of right lower extremity.    Subjective   Patient postop day 1 of incision and drainage of right calf abscess.  Patient's pain postoperatively has increased.  Will continue assessment should improve by tomorrow.         Objective     Vitals:    07/18/24 0843   BP: 112/56   Pulse: 74   Resp: 17   Temp: 36 °C (96.8 °F)   SpO2: 94%      Physical Exam  Constitutional: Well developed , awake/alert/oriented x3, in no distress,  Eyes: Clear sclera  ENMT: mucous membranes are moist, no apparent injury, no lesions seen,   Head/neck: Neck supple, trachea  is midline, no apparent injury, no bruits, no mass, no stridor  Respiratory/thorax: Breath sounds clear and equal bilaterally with good chest expansion, thorax symmetric  Cardiac/Vascular: Regular, rate and rhythm, no murmurs, 2+ radial pulses, palpable DP and PTs  Gastrointestinal: Nondistended soft nontender, positive bowel sounds, no bruits.  Musculoskeletal: Moves all extremities, limited range of motion , no joint swelling,   Extremities: No cyanosis, erythema on thigh and calf have reduced and is not as extensive.  Patient has Ace wrap right calf.  Patient provided picture of right calf wound medial negative for erythema or much drainage.,   Neurological: Alert and oriented x3,   Lymphatic: No significant lymphadenopathy  Skin: Warm and dry, no lesions, no rashes  Psychological: Appropriate mood and behavior  Blood pressure 112/56, pulse 74, temperature 36 °C (96.8 °F), temperature source Temporal, resp. rate 17, height 1.702 m (5' 7\"), weight 86.2 kg (190 lb), SpO2 94%.        Intake/Output last 3 Shifts:  I/O last 3 completed shifts:  In: 2090 (24.3 mL/kg) [P.O.:240; IV Piggyback:1850]  Out: 3265 (37.9 mL/kg) [Urine:3250 (1 mL/kg/hr); Blood:15]  Weight: 86.2 kg     Patient Active Problem List    Diagnosis Date Noted    Cellulitis of right lower extremity 07/15/2024    ADHD (attention " deficit hyperactivity disorder) 11/04/2023    Erectile dysfunction 11/04/2023    Generalized anxiety disorder 11/04/2023    Vitamin D deficiency 11/04/2023    Depression 03/08/2021    Gastroesophageal reflux disease 02/06/2020    Abscess of right lower extremity 07/17/2024          Current Facility-Administered Medications:     acetaminophen (Tylenol) tablet 650 mg, 650 mg, oral, q4h PRN, 650 mg at 07/16/24 1736 **OR** acetaminophen (Tylenol) oral liquid 650 mg, 650 mg, oral, q4h PRN, Obed Taylor DO    enoxaparin (Lovenox) syringe 40 mg, 40 mg, subcutaneous, Daily, Ralf Jackson MD, 40 mg at 07/18/24 0802    HYDROmorphone (Dilaudid) injection 1 mg, 1 mg, intravenous, q4h PRN, Obed Taylor DO, 1 mg at 07/18/24 0939    linezolid (Zyvox)  mg in 300 mL, 600 mg, intravenous, q12h, Obed Taylor DO, Stopped at 07/18/24 1143    ondansetron (Zofran) tablet 4 mg, 4 mg, oral, q6h PRN **OR** ondansetron (Zofran) injection 4 mg, 4 mg, intravenous, q6h PRN, Obed Taylor DO, 4 mg at 07/15/24 1851    piperacillin-tazobactam (Zosyn) 4.5 g in dextrose (iso)  mL, 4.5 g, intravenous, q6h, Obed Taylor DO, Stopped at 07/18/24 1230    polyethylene glycol (Glycolax, Miralax) packet 17 g, 17 g, oral, Daily PRN, Obed Taylor DO, 17 g at 07/18/24 0546    traMADol (Ultram) tablet 50 mg, 50 mg, oral, q4h PRN, Obed Taylor DO, 50 mg at 07/18/24 1203     Lab Results   Component Value Date    WBC 9.3 07/18/2024    HGB 12.3 (L) 07/18/2024    HCT 34.9 (L) 07/18/2024     07/18/2024    ALT 45 07/18/2024    AST 25 07/18/2024     (L) 07/18/2024    K 4.1 07/18/2024    CL 98 07/18/2024    CREATININE 0.94 07/18/2024    BUN 9 07/18/2024    CO2 27 07/18/2024    BLOODCULT No growth at 2 days 07/15/2024    BLOODCULT No growth at 2 days 07/15/2024       US right upper quadrant    Result Date: 7/17/2024  Interpreted By:  Geoffrey Rodarte, STUDY: US RIGHT UPPER QUADRANT;  7/16/2024 6:35 pm   INDICATION:  Signs/Symptoms:elevated LFT's.   COMPARISON: None.   ACCESSION NUMBER(S): [id number]   ORDERING CLINICIAN: GORDY OLVERA   TECHNIQUE: Multiple images of the right upper quadrant were obtained.  The study was somewhat limited due to the patient's body habitus and overlying bowel gas.   FINDINGS: LIVER: The liver measures 18.4 cm and is diffusely echogenic in appearance, consistent with diffuse fatty infiltration. The resulting increased beam attenuation thereby limiting evaluation of the liver for focal lesions. Within the limitations, no focal lesions are seen.     GALLBLADDER: The gallbladder is nondistended, and demonstrates no evidence of gallstones, wall thickening or surrounding fluid. The gallbladder wall thickness is 0.3 cm. Sonographic Kessler's sign is negative.     BILE DUCTS: No evidence of intra or extrahepatic biliary dilatation is identified; the common bile duct measures 0.6 cm.   PANCREAS: The pancreas is poorly visualized due to overlying bowel gas.   RIGHT KIDNEY: The right kidney measures 10.6 cm in length. The renal cortical echogenicity and thickness are within normal limit.  No hydronephrosis or renal calculi are seen.       Enlarged liver with diffuse fatty infiltration. The pancreas was poorly visualized.   Otherwise, limited, but grossly unremarkable right upper quadrant ultrasound.   MACRO: None   Signed by: Geoffrey Rodarte 7/17/2024 6:12 AM Dictation workstation:   [id number]    CT angio lower extremity right w and or wo IV contrast    Result Date: 7/16/2024  Interpreted By:  Lisa Tristan, STUDY: CT ANGIO LOWER EXTREMITY RIGHT W AND OR WO IV CONTRAST; ;  7/16/2024 8:41 pm   INDICATION: Signs/Symptoms:rule out abcess and necrotizing fasicitis. please do upper thiggh and lower leg..   COMPARISON: Correlation with right femur and tibia fibula radiographs of the same day   ACCESSION NUMBER(S): [id number]   ORDERING CLINICIAN: ANGELA INNO   TECHNIQUE: Thin-section axial postcontrast  images of the right lower extremity vasculature from the aortic bifurcation through the toes. Delayed images from the base of the toes were obtained. Images were reconstructed axial, sagittal and coronal planes. 3D and maximum intensity projection images were generated a separate workstation and reviewed   FINDINGS: The right common iliac vein, internal iliac vein, common femoral vein, profunda femoral, femoral vein, popliteal vein, anterior tibial vein, posterior tibial vein, peroneal vein, dorsalis pedis and plantar branches are patent with no significant stenosis.   There is subcutaneous edema throughout the right lower extremity, mild involving the thigh, moderate around the knee and proximal leg and large amount of edema at the level of the ankle and dorsum of the foot. There is an area of more focal fluid in the anterior mid leg, abutting the deep fascia, measuring approximately 3.5 x 1 x 5 cm. No enhancing capsule.   No soft tissue gas. Intermuscular and intramuscular fat planes are preserved.   There is a multilocular fluid collection abutting the posterior proximal tibiofibular joint, measuring approximately 16 x 6 x 30 mm, the location and appearance is most typical of a capsular ganglion cyst. No knee joint effusion or Baker's cyst.   There is non opacification of a branch of the greater saphenous vein below the level of the knee to the dorsum of the foot (series 606, images 133 through 225). Evaluation of the deep veins is limited on CT, however, no occlusive thrombus is evident on the delayed images, which are obtained from the level of the popliteal vein through the feet.   Mild enlarged right inguinal lymph nodes are likely reactive. Imaged intrapelvic structures are unremarkable.   No osseous destruction or abnormal periosteal bone formation. No acute fracture or malalignment.       Diffuse skin thickening and subcutaneous edema throughout the right lower extremity, most significantly involving the  lower leg and ankle. While there is no soft tissue gas, early necrotizing fasciitis cannot be excluded by imaging, and clinical correlation is necessary.   Suspected developing abscess in the mid anterior leg measuring approximately 3.5 x 1 x 5 cm.   Suspected acute thrombophlebitis of a greater saphenous vein branch below the knee.   No acute osseous abnormality.     MACRO: None   Signed by: Lisa Tristan 7/16/2024 10:44 PM Dictation workstation:   [id number]    XR femur right 2+ views    Result Date: 7/15/2024  Interpreted By:  Cynthia Keita, STUDY: XR FEMUR RIGHT 2+ VIEWS; XR TIBIA FIBULA RIGHT 2 VIEWS;  7/15/2024 12:57 pm   INDICATION: Signs/Symptoms:edema erythema cellulitis.   COMPARISON: None.   ACCESSION NUMBER(S): [id number]; [id number]   ORDERING CLINICIAN: BHARATHI MALDONADO   FINDINGS: No acute fracture or osseous destruction. Imaged hip, knee and ankle joints are grossly unremarkable. No knee joint effusion. Diffuse subcutaneous edema more pronounced in the lower leg.       No acute osseous abnormality.   MACRO None   Signed by: Cynthia Keita 7/15/2024 1:17 PM Dictation workstation:   [id number]    XR tibia fibula right 2 views    Result Date: 7/15/2024  Interpreted By:  Cynthia Keita, STUDY: XR FEMUR RIGHT 2+ VIEWS; XR TIBIA FIBULA RIGHT 2 VIEWS;  7/15/2024 12:57 pm   INDICATION: Signs/Symptoms:edema erythema cellulitis.   COMPARISON: None.   ACCESSION NUMBER(S): [id number]; [id number]   ORDERING CLINICIAN: BHARATHI MALDONADO   FINDINGS: No acute fracture or osseous destruction. Imaged hip, knee and ankle joints are grossly unremarkable. No knee joint effusion. Diffuse subcutaneous edema more pronounced in the lower leg.       No acute osseous abnormality.   MACRO None   Signed by: Cynthia Keita 7/15/2024 1:17 PM Dictation workstation:   [id number]                Assessment/Plan   Principal Problem:    Cellulitis of right lower extremity  Active Problems:    Abscess of right lower  extremity  Patient postop day 1 I&D of right calf abscess performed by Dr. Taylor.  Cultures are pending.  Discussed patient case with Dr. Taylor.  Continue antibiotic therapy per infectious disease.  Patient can ambulate out of bed.  While on bedrest patient should continue to elevate right lower extremity above the heart if possible.  Continue wound care as ordered.  Continue to monitor signs of infection.  If patient's condition remains stable may be discharged tomorrow.  Patient can follow-up with Dr. Taylor in wound care clinic referral has been placed.    Thank you very much for allowing [facility] to be involved in the care of your patient sincerely Romie Joyner APRN-CNP .  (This note was generated with voice recognition software and may contain errors including spelling, grammar, syntax and missed recognition of what was dictated, of which may not have been fully corrected)      Romie Joyner, APRN-CNP

## 2024-07-19 ENCOUNTER — APPOINTMENT (OUTPATIENT)
Dept: RADIOLOGY | Facility: HOSPITAL | Age: 29
End: 2024-07-19
Payer: COMMERCIAL

## 2024-07-19 LAB
BACTERIA BLD CULT: NORMAL
BACTERIA BLD CULT: NORMAL
BACTERIA SPEC CULT: NORMAL
GRAM STN SPEC: NORMAL
GRAM STN SPEC: NORMAL

## 2024-07-19 PROCEDURE — 2500000004 HC RX 250 GENERAL PHARMACY W/ HCPCS (ALT 636 FOR OP/ED): Performed by: SURGERY

## 2024-07-19 PROCEDURE — 1100000001 HC PRIVATE ROOM DAILY

## 2024-07-19 PROCEDURE — 2500000001 HC RX 250 WO HCPCS SELF ADMINISTERED DRUGS (ALT 637 FOR MEDICARE OP): Performed by: INTERNAL MEDICINE

## 2024-07-19 PROCEDURE — 73706 CT ANGIO LWR EXTR W/O&W/DYE: CPT | Mod: RT

## 2024-07-19 PROCEDURE — 2500000004 HC RX 250 GENERAL PHARMACY W/ HCPCS (ALT 636 FOR OP/ED): Performed by: INTERNAL MEDICINE

## 2024-07-19 PROCEDURE — 2500000001 HC RX 250 WO HCPCS SELF ADMINISTERED DRUGS (ALT 637 FOR MEDICARE OP): Performed by: SURGERY

## 2024-07-19 PROCEDURE — 2550000001 HC RX 255 CONTRASTS: Performed by: INTERNAL MEDICINE

## 2024-07-19 PROCEDURE — 73706 CT ANGIO LWR EXTR W/O&W/DYE: CPT | Mod: RIGHT SIDE | Performed by: RADIOLOGY

## 2024-07-19 RX ORDER — OXYCODONE AND ACETAMINOPHEN 5; 325 MG/1; MG/1
1 TABLET ORAL EVERY 6 HOURS PRN
Status: DISCONTINUED | OUTPATIENT
Start: 2024-07-19 | End: 2024-07-20 | Stop reason: HOSPADM

## 2024-07-19 RX ORDER — HYDROMORPHONE HYDROCHLORIDE 1 MG/ML
1 INJECTION, SOLUTION INTRAMUSCULAR; INTRAVENOUS; SUBCUTANEOUS EVERY 4 HOURS PRN
Status: DISCONTINUED | OUTPATIENT
Start: 2024-07-19 | End: 2024-07-20 | Stop reason: HOSPADM

## 2024-07-19 RX ORDER — ACETAMINOPHEN 325 MG/1
650 TABLET ORAL EVERY 6 HOURS
Status: DISCONTINUED | OUTPATIENT
Start: 2024-07-19 | End: 2024-07-20 | Stop reason: HOSPADM

## 2024-07-19 RX ORDER — TRAMADOL HYDROCHLORIDE 50 MG/1
50 TABLET ORAL ONCE
Status: COMPLETED | OUTPATIENT
Start: 2024-07-19 | End: 2024-07-19

## 2024-07-19 ASSESSMENT — PAIN - FUNCTIONAL ASSESSMENT
PAIN_FUNCTIONAL_ASSESSMENT: 0-10

## 2024-07-19 ASSESSMENT — PAIN SCALES - GENERAL
PAINLEVEL_OUTOF10: 8
PAINLEVEL_OUTOF10: 4
PAINLEVEL_OUTOF10: 7
PAINLEVEL_OUTOF10: 8
PAINLEVEL_OUTOF10: 5 - MODERATE PAIN
PAINLEVEL_OUTOF10: 4
PAINLEVEL_OUTOF10: 8
PAINLEVEL_OUTOF10: 7
PAINLEVEL_OUTOF10: 1
PAINLEVEL_OUTOF10: 4
PAINLEVEL_OUTOF10: 4
PAINLEVEL_OUTOF10: 8

## 2024-07-19 ASSESSMENT — PAIN DESCRIPTION - ORIENTATION
ORIENTATION: RIGHT

## 2024-07-19 ASSESSMENT — PAIN DESCRIPTION - LOCATION
LOCATION: LEG

## 2024-07-19 ASSESSMENT — PAIN SCALES - PAIN ASSESSMENT IN ADVANCED DEMENTIA (PAINAD)
TOTALSCORE: MEDICATION (SEE MAR)
TOTALSCORE: MEDICATION (SEE MAR)

## 2024-07-19 NOTE — PROGRESS NOTES
Mac Perez is a 28 y.o. male on day 4 of admission presenting with Cellulitis of right lower extremity.      Subjective   Nursing called me that patient wants transferred to another primary care physician and patient transferred to Dr. Kevin hewitt and he has accepted the patient.  Please refer to his note from today.       Objective     Last Recorded Vitals  /61 (BP Location: Right arm, Patient Position: Lying)   Pulse 67   Temp 36.5 °C (97.7 °F) (Temporal)   Resp 16   Wt 86.2 kg (190 lb)   SpO2 98%   Intake/Output last 3 Shifts:    Intake/Output Summary (Last 24 hours) at 7/19/2024 1131  Last data filed at 7/19/2024 0551  Gross per 24 hour   Intake 800 ml   Output --   Net 800 ml       Admission Weight  Weight: 86.2 kg (190 lb) (07/15/24 1127)    Daily Weight  07/17/24 : 86.2 kg (190 lb)    Image Results  US right upper quadrant  Narrative: Interpreted By:  Geoffrey Rodarte,   STUDY:  US RIGHT UPPER QUADRANT;  7/16/2024 6:35 pm      INDICATION:  Signs/Symptoms:elevated LFT's.      COMPARISON:  None.      ACCESSION NUMBER(S):  [id number]      ORDERING CLINICIAN:  GORDY OLVERA      TECHNIQUE:  Multiple images of the right upper quadrant were obtained.  The study  was somewhat limited due to the patient's body habitus and overlying  bowel gas.      FINDINGS:  LIVER:  The liver measures 18.4 cm and is diffusely echogenic in appearance,  consistent with diffuse fatty infiltration. The resulting increased  beam attenuation thereby limiting evaluation of the liver for focal  lesions. Within the limitations, no focal lesions are seen.          GALLBLADDER:  The gallbladder is nondistended, and demonstrates no evidence of  gallstones, wall thickening or surrounding fluid. The gallbladder  wall thickness is 0.3 cm. Sonographic Kessler's sign is negative.          BILE DUCTS:  No evidence of intra or extrahepatic biliary dilatation is  identified; the common bile duct measures 0.6 cm.      PANCREAS:  The pancreas  is poorly visualized due to overlying bowel gas.      RIGHT KIDNEY:  The right kidney measures 10.6 cm in length. The renal cortical  echogenicity and thickness are within normal limit.  No  hydronephrosis or renal calculi are seen.      Impression: Enlarged liver with diffuse fatty infiltration. The pancreas was  poorly visualized.      Otherwise, limited, but grossly unremarkable right upper quadrant  ultrasound.      MACRO:  None      Signed by: Geoffrey Rodarte 7/17/2024 6:12 AM  Dictation workstation:   [id number]      Physical Exam    Relevant Results               Assessment/Plan                  Principal Problem:    Cellulitis of right lower extremity  Active Problems:    Abscess of right lower extremity                  Ralf Jackson MD

## 2024-07-19 NOTE — PROGRESS NOTES
Mac Perez is a 28 y.o. male on day 4 of admission presenting with Cellulitis of right lower extremity.      Subjective   Patient seen and examined by me.  Patient is resting in bed and states he still has mild to moderate pain and wants another IV Dilaudid this evening and after that he states it can be discontinued and he will manage with p.o. tramadol overnight.  Patient is awake alert oriented x 3.  His right leg swelling and right thigh swelling is much improving.  Patient is being followed by Dr. Taylor/vascular surgery s/p I&D of the abscess from his right calf.  Patient remains on IV Zyvox and IV Zosyn.  Vitals and labs noted.  Patient is afebrile.  His LFTs have resolved and improved.       Objective     Last Recorded Vitals  /61 (BP Location: Right arm, Patient Position: Lying)   Pulse 67   Temp 36.5 °C (97.7 °F) (Temporal)   Resp 16   Wt 86.2 kg (190 lb)   SpO2 98%   Intake/Output last 3 Shifts:    Intake/Output Summary (Last 24 hours) at 7/19/2024 1520  Last data filed at 7/19/2024 1232  Gross per 24 hour   Intake 1000 ml   Output --   Net 1000 ml       Admission Weight  Weight: 86.2 kg (190 lb) (07/15/24 1127)    Daily Weight  07/17/24 : 86.2 kg (190 lb)    Image Results  US right upper quadrant  Narrative: Interpreted By:  Geoffrey Rodarte,   STUDY:  US RIGHT UPPER QUADRANT;  7/16/2024 6:35 pm      INDICATION:  Signs/Symptoms:elevated LFT's.      COMPARISON:  None.      ACCESSION NUMBER(S):  [id number]      ORDERING CLINICIAN:  GORDY OLVERA      TECHNIQUE:  Multiple images of the right upper quadrant were obtained.  The study  was somewhat limited due to the patient's body habitus and overlying  bowel gas.      FINDINGS:  LIVER:  The liver measures 18.4 cm and is diffusely echogenic in appearance,  consistent with diffuse fatty infiltration. The resulting increased  beam attenuation thereby limiting evaluation of the liver for focal  lesions. Within the limitations, no focal lesions are  seen.          GALLBLADDER:  The gallbladder is nondistended, and demonstrates no evidence of  gallstones, wall thickening or surrounding fluid. The gallbladder  wall thickness is 0.3 cm. Sonographic Kessler's sign is negative.          BILE DUCTS:  No evidence of intra or extrahepatic biliary dilatation is  identified; the common bile duct measures 0.6 cm.      PANCREAS:  The pancreas is poorly visualized due to overlying bowel gas.      RIGHT KIDNEY:  The right kidney measures 10.6 cm in length. The renal cortical  echogenicity and thickness are within normal limit.  No  hydronephrosis or renal calculi are seen.      Impression: Enlarged liver with diffuse fatty infiltration. The pancreas was  poorly visualized.      Otherwise, limited, but grossly unremarkable right upper quadrant  ultrasound.      MACRO:  None      Signed by: Geoffrey Rodarte 7/17/2024 6:12 AM  Dictation workstation:   [id number]      Physical Exam  Vitals and nursing note reviewed.   Constitutional:       General: He is not in acute distress.     Appearance: Normal appearance. He is normal weight. He is not ill-appearing or toxic-appearing.   HENT:      Head: Normocephalic and atraumatic.      Right Ear: Tympanic membrane and ear canal normal. There is no impacted cerumen.      Left Ear: Tympanic membrane, ear canal and external ear normal.      Nose: Nose normal. No congestion or rhinorrhea.      Mouth/Throat:      Pharynx: Oropharynx is clear. No oropharyngeal exudate or posterior oropharyngeal erythema.   Eyes:      General: No scleral icterus.        Right eye: No discharge.         Left eye: No discharge.      Extraocular Movements: Extraocular movements intact.      Conjunctiva/sclera: Conjunctivae normal.      Pupils: Pupils are equal, round, and reactive to light.   Neck:      Vascular: No carotid bruit.   Cardiovascular:      Rate and Rhythm: Normal rate and regular rhythm.      Pulses: Normal pulses.      Heart sounds: Normal heart  sounds. No murmur heard.     No gallop.   Pulmonary:      Effort: Pulmonary effort is normal. No respiratory distress.      Breath sounds: Normal breath sounds. No wheezing, rhonchi or rales.   Abdominal:      General: Abdomen is flat. Bowel sounds are normal. There is no distension.      Palpations: Abdomen is soft. There is no mass.      Tenderness: There is no abdominal tenderness. There is no right CVA tenderness, left CVA tenderness, guarding or rebound.      Hernia: No hernia is present.   Musculoskeletal:         General: No swelling. Normal range of motion.      Cervical back: Normal range of motion and neck supple. No rigidity.      Left lower leg: Tenderness present. No edema.      Comments: Right thigh and leg and knee swelling and erythema is much better.  Patient is s/p I&D of the right calf abscess.   Lymphadenopathy:      Cervical: No cervical adenopathy.   Skin:     General: Skin is warm.      Coloration: Skin is not jaundiced.      Findings: No erythema or rash.   Neurological:      General: No focal deficit present.      Mental Status: He is alert and oriented to person, place, and time. Mental status is at baseline.      Sensory: No sensory deficit.      Motor: No weakness.      Gait: Gait normal.      Deep Tendon Reflexes: Reflexes normal.   Psychiatric:         Mood and Affect: Mood normal.         Thought Content: Thought content normal.         Judgment: Judgment normal.         Relevant Results      Current Facility-Administered Medications:     acetaminophen (Tylenol) tablet 650 mg, 650 mg, oral, q4h PRN, 650 mg at 07/16/24 1736 **OR** acetaminophen (Tylenol) oral liquid 650 mg, 650 mg, oral, q4h PRN, Obed Taylor, DO    acetaminophen (Tylenol) tablet 650 mg, 650 mg, oral, q6h, Ralf Jackson MD, 650 mg at 07/19/24 1401    enoxaparin (Lovenox) syringe 40 mg, 40 mg, subcutaneous, Daily, Ralf Jackson MD, 40 mg at 07/19/24 0801    HYDROmorphone (Dilaudid) injection 1 mg, 1 mg, intravenous,  q4h PRN, Obed Taylor DO, 1 mg at 07/19/24 1211    linezolid (Zyvox)  mg in 300 mL, 600 mg, intravenous, q12h, Obed Tayolr DO, Stopped at 07/19/24 1203    ondansetron (Zofran) tablet 4 mg, 4 mg, oral, q6h PRN **OR** ondansetron (Zofran) injection 4 mg, 4 mg, intravenous, q6h PRN, Obed Taylor DO, 4 mg at 07/15/24 1851    oxyCODONE-acetaminophen (Percocet) 5-325 mg per tablet 1 tablet, 1 tablet, oral, q6h PRN, Heriberto Shukla MD    piperacillin-tazobactam (Zosyn) 4.5 g in dextrose (iso)  mL, 4.5 g, intravenous, q6h, Obed Taylor DO, Stopped at 07/19/24 1259    polyethylene glycol (Glycolax, Miralax) packet 17 g, 17 g, oral, Daily PRN, Obed Taylor DO, 17 g at 07/18/24 0546   No results found for this or any previous visit (from the past 24 hour(s)).   US right upper quadrant    Result Date: 7/17/2024  Interpreted By:  Geoffrey Rodarte, STUDY: US RIGHT UPPER QUADRANT;  7/16/2024 6:35 pm   INDICATION: Signs/Symptoms:elevated LFT's.   COMPARISON: None.   ACCESSION NUMBER(S): [id number]   ORDERING CLINICIAN: GORDY OLVERA   TECHNIQUE: Multiple images of the right upper quadrant were obtained.  The study was somewhat limited due to the patient's body habitus and overlying bowel gas.   FINDINGS: LIVER: The liver measures 18.4 cm and is diffusely echogenic in appearance, consistent with diffuse fatty infiltration. The resulting increased beam attenuation thereby limiting evaluation of the liver for focal lesions. Within the limitations, no focal lesions are seen.     GALLBLADDER: The gallbladder is nondistended, and demonstrates no evidence of gallstones, wall thickening or surrounding fluid. The gallbladder wall thickness is 0.3 cm. Sonographic Kessler's sign is negative.     BILE DUCTS: No evidence of intra or extrahepatic biliary dilatation is identified; the common bile duct measures 0.6 cm.   PANCREAS: The pancreas is poorly visualized due to overlying bowel gas.   RIGHT KIDNEY: The  right kidney measures 10.6 cm in length. The renal cortical echogenicity and thickness are within normal limit.  No hydronephrosis or renal calculi are seen.       Enlarged liver with diffuse fatty infiltration. The pancreas was poorly visualized.   Otherwise, limited, but grossly unremarkable right upper quadrant ultrasound.   MACRO: None   Signed by: Geoffrey Rodarte 7/17/2024 6:12 AM Dictation workstation:   [id number]    CT angio lower extremity right w and or wo IV contrast    Result Date: 7/16/2024  Interpreted By:  Lisa Tristan, STUDY: CT ANGIO LOWER EXTREMITY RIGHT W AND OR WO IV CONTRAST; ;  7/16/2024 8:41 pm   INDICATION: Signs/Symptoms:rule out abcess and necrotizing fasicitis. please do upper thiggh and lower leg..   COMPARISON: Correlation with right femur and tibia fibula radiographs of the same day   ACCESSION NUMBER(S): [id number]   ORDERING CLINICIAN: ANGELA NINO   TECHNIQUE: Thin-section axial postcontrast images of the right lower extremity vasculature from the aortic bifurcation through the toes. Delayed images from the base of the toes were obtained. Images were reconstructed axial, sagittal and coronal planes. 3D and maximum intensity projection images were generated a separate workstation and reviewed   FINDINGS: The right common iliac vein, internal iliac vein, common femoral vein, profunda femoral, femoral vein, popliteal vein, anterior tibial vein, posterior tibial vein, peroneal vein, dorsalis pedis and plantar branches are patent with no significant stenosis.   There is subcutaneous edema throughout the right lower extremity, mild involving the thigh, moderate around the knee and proximal leg and large amount of edema at the level of the ankle and dorsum of the foot. There is an area of more focal fluid in the anterior mid leg, abutting the deep fascia, measuring approximately 3.5 x 1 x 5 cm. No enhancing capsule.   No soft tissue gas. Intermuscular and intramuscular fat planes are  preserved.   There is a multilocular fluid collection abutting the posterior proximal tibiofibular joint, measuring approximately 16 x 6 x 30 mm, the location and appearance is most typical of a capsular ganglion cyst. No knee joint effusion or Baker's cyst.   There is non opacification of a branch of the greater saphenous vein below the level of the knee to the dorsum of the foot (series 606, images 133 through 225). Evaluation of the deep veins is limited on CT, however, no occlusive thrombus is evident on the delayed images, which are obtained from the level of the popliteal vein through the feet.   Mild enlarged right inguinal lymph nodes are likely reactive. Imaged intrapelvic structures are unremarkable.   No osseous destruction or abnormal periosteal bone formation. No acute fracture or malalignment.       Diffuse skin thickening and subcutaneous edema throughout the right lower extremity, most significantly involving the lower leg and ankle. While there is no soft tissue gas, early necrotizing fasciitis cannot be excluded by imaging, and clinical correlation is necessary.   Suspected developing abscess in the mid anterior leg measuring approximately 3.5 x 1 x 5 cm.   Suspected acute thrombophlebitis of a greater saphenous vein branch below the knee.   No acute osseous abnormality.     MACRO: None   Signed by: Lisa Tristan 7/16/2024 10:44 PM Dictation workstation:   [id number]    XR femur right 2+ views    Result Date: 7/15/2024  Interpreted By:  Cynthia Keita, STUDY: XR FEMUR RIGHT 2+ VIEWS; XR TIBIA FIBULA RIGHT 2 VIEWS;  7/15/2024 12:57 pm   INDICATION: Signs/Symptoms:edema erythema cellulitis.   COMPARISON: None.   ACCESSION NUMBER(S): [id number]; [id number]   ORDERING CLINICIAN: BHARATHI MALDONADO   FINDINGS: No acute fracture or osseous destruction. Imaged hip, knee and ankle joints are grossly unremarkable. No knee joint effusion. Diffuse subcutaneous edema more pronounced in the lower leg.        No acute osseous abnormality.   MACRO None   Signed by: Cynthia Keita 7/15/2024 1:17 PM Dictation workstation:   [id number]    XR tibia fibula right 2 views    Result Date: 7/15/2024  Interpreted By:  Cynthia Keita, STUDY: XR FEMUR RIGHT 2+ VIEWS; XR TIBIA FIBULA RIGHT 2 VIEWS;  7/15/2024 12:57 pm   INDICATION: Signs/Symptoms:edema erythema cellulitis.   COMPARISON: None.   ACCESSION NUMBER(S): [id number]; [id number]   ORDERING CLINICIAN: BHARATHI MALDONADO   FINDINGS: No acute fracture or osseous destruction. Imaged hip, knee and ankle joints are grossly unremarkable. No knee joint effusion. Diffuse subcutaneous edema more pronounced in the lower leg.       No acute osseous abnormality.   MACRO None   Signed by: Cynthia Keita 7/15/2024 1:17 PM Dictation workstation:   [id number]    Vascular US lower extremity venous duplex right    Result Date: 7/14/2024  Interpreted By:  Lisa Tristan, STUDY: VAS US LOWER EXTREMITY VENOUS DUPLEX RIGHT;  7/14/2024 12:00 am   INDICATION: Signs/Symptoms:swelling rle.   COMPARISON: None.   ACCESSION NUMBER(S): [id number]   ORDERING CLINICIAN: DONNA STANLEY   TECHNIQUE: Grayscale, color and spectral Doppler sonographic images of the right lower extremity deep venous system. The left common femoral vein was imaged for comparison.   FINDINGS: There is normal compressibility of the right common femoral vein, saphenous femoral junction, femoral vein and popliteal vein. The posterior tibial and peroneal veins demonstrate normal color flow and compressibility. There is normal spontaneous and phasic variation throughout the leg by spectral doppler.   The left common femoral vein is patent.   OTHER FINDINGS: None.       No sonographic evidence of acute DVT in the right lower extremity.       MACRO: None   Signed by: Lisa Tristan 7/14/2024 12:52 AM Dictation workstation:   [id number]    XR chest 1 view    Result Date: 7/13/2024  Interpreted By:  Lisa Tristan, STUDY: XR  CHEST 1 VIEW;  7/13/2024 10:42 pm   INDICATION: Signs/Symptoms:sob.   COMPARISON: None.   ACCESSION NUMBER(S): [id number]   ORDERING CLINICIAN: DONNA STANLEY   FINDINGS:     CARDIOMEDIASTINAL SILHOUETTE: Cardiomediastinal silhouette is normal in size and configuration.   LUNGS: No pulmonary consolidation, pleural effusion or pneumothorax.   ABDOMEN: No remarkable upper abdominal findings.   BONES: No acute osseous abnormality.       No acute cardiopulmonary process.   MACRO: None   Signed by: Lisa Tristan 7/13/2024 11:19 PM Dictation workstation:   [id number]         Assessment/Plan    #1/Right leg and thigh severe cellulitis/s/p poison ivy rash/right calf abscess which required I&D by vascular surgery/patient remains on IV Zosyn and IV Zyvox.  His leukocytosis has improved.  Patient is afebrile.  Right leg swelling and erythema is improving.  Possible discharge in the next 24 to 48 hours once cleared by vascular surgery and ID decides on p.o. antibiotics.  Patient is ambulating on his right leg and states that his IV Dilaudid can be discontinued after this evening's dose and he will try to manage with p.o. tramadol and still has moderate pain.  Clinically patient is doing much better.  #2/acute transaminitis and elevated LFTs which have resolved.  Ultrasound of the abdomen showed fatty liver.  Patient has been advised on low-cholesterol diet and losing weight.  3./DVT prophylaxis patient remains on subcu Lovenox for DVT prophylaxis.  Possible discharge in the next 24 to 48 hours.  Further p.o. antibiotics as per ID.  As per vascular surgery patient will follow in the wound center with Dr. Taylor as per his instructions on discharge.  Labs and vitals noted and reviewed.  Consultation notes reviewed and noted.  Medications reconciled.  Total time spent 20 minutes/time spent on patient interaction/counseling/assessment and plan and documentation.          Principal Problem:    Cellulitis of right lower  extremity  Active Problems:    Abscess of right lower extremity  Fully evaluated and case reviewed,  continue present antibiotics,  repeat ct scan.  Percocet for pain                Heriberto Shukla MD

## 2024-07-19 NOTE — PROGRESS NOTES
Infectious disease progress note  Subjective   Severe cellulitis right leg  Fluid collection on ct scan    Antibiotics  Zosyn day 5  Zyvox day 4    Objective   Range of Vitals (last 24 hours)  Heart Rate:  [52-67]   Temp:  [35.1 °C (95.2 °F)-36.7 °C (98.1 °F)]   Resp:  [16]   BP: (105-126)/(58-70)   SpO2:  [95 %-99 %]   Daily Weight  07/17/24 : 86.2 kg (190 lb)    Body mass index is 29.76 kg/m².      Physical Exam  Pt refusing ct which was ordered by dr. Taylor  He has increased tenderness on the anterior tibial area with severe pain  The leg is much less red and wound is packed      Relevant Results  Labs  Lab Results   Component Value Date    WBC 9.3 07/18/2024    HGB 12.3 (L) 07/18/2024    HCT 34.9 (L) 07/18/2024    MCV 90 07/18/2024     07/18/2024     Lab Results   Component Value Date    GLUCOSE 143 (H) 07/18/2024    CALCIUM 8.6 07/18/2024     (L) 07/18/2024    K 4.1 07/18/2024    CO2 27 07/18/2024    CL 98 07/18/2024    BUN 9 07/18/2024    CREATININE 0.94 07/18/2024   ESR: --  Lab Results   Component Value Date    SEDRATE 8 07/15/2024     Lab Results   Component Value Date    CRP 29.92 (H) 07/15/2024     Lab Results   Component Value Date    ALT 45 07/18/2024    AST 25 07/18/2024    ALKPHOS 80 07/18/2024    BILITOT 0.5 07/18/2024       Microbiology  7- blood cultures no growth at 2 days  7- blood cultures no growth at 1 day  7- wound cultures are pending    Imaging  7- CT of right leg shows a developing abscess in the mid anterior leg measuring 3.5 x 1 x 5 cm.  Also early necrotizing fasciitis cannot be excluded     Assessment/Plan   Continue current antibx till final or cultures back. If no growth he can go home on po zyvox  Spoke to pt about the importance of the ct to asses further fluid/abscess collections and also the is Friday and if further aspiration is needed IR might not  be available till Monday. He wishes to wait for his wife  3. Suzette Byrne  our pharmD went  over meds in detail today and previously. He had no further questions for her    Other issues  No past medical history    I reviewed and interpreted all lab test imaging studies and documentations from other healthcare providers  I am monitoring for antibiotic side effects and toxicity     Wendy Arnett MD

## 2024-07-19 NOTE — NURSING NOTE
This nurse has had this patient for the past three nights (7/16, 7/17, 7/18). Patient & wife have shared concerns with me regarding medications & communication concerns with providers. This nurse has communicated patient & wife's concerns with all providers. Plan of care ongoing.

## 2024-07-19 NOTE — CARE PLAN
Problem: Pain  Goal: Takes deep breaths with improved pain control throughout the shift  Outcome: Progressing  Goal: Turns in bed with improved pain control throughout the shift  Outcome: Progressing  Goal: Walks with improved pain control throughout the shift  Outcome: Progressing  Goal: Performs ADL's with improved pain control throughout shift  Outcome: Progressing  Goal: Participates in PT with improved pain control throughout the shift  Outcome: Progressing  Goal: Free from opioid side effects throughout the shift  Outcome: Progressing  Goal: Free from acute confusion related to pain meds throughout the shift  Outcome: Progressing     Problem: Skin  Goal: Decreased wound size/increased tissue granulation at next dressing change  Outcome: Progressing  Flowsheets (Taken 7/19/2024 0907)  Decreased wound size/increased tissue granulation at next dressing change: Promote sleep for wound healing  Goal: Participates in plan/prevention/treatment measures  Outcome: Progressing  Flowsheets (Taken 7/19/2024 0907)  Participates in plan/prevention/treatment measures: Elevate heels  Goal: Prevent/manage excess moisture  Outcome: Progressing  Flowsheets (Taken 7/19/2024 0907)  Prevent/manage excess moisture: Monitor for/manage infection if present  Goal: Prevent/minimize sheer/friction injuries  Outcome: Progressing  Flowsheets (Taken 7/19/2024 0907)  Prevent/minimize sheer/friction injuries: HOB 30 degrees or less  Goal: Promote/optimize nutrition  Outcome: Progressing  Flowsheets (Taken 7/19/2024 0907)  Promote/optimize nutrition: Consume > 50% meals/supplements  Goal: Promote skin healing  Outcome: Progressing  Flowsheets (Taken 7/19/2024 0907)  Promote skin healing: Assess skin/pad under line(s)/device(s)     Problem: Pain - Adult  Goal: Verbalizes/displays adequate comfort level or baseline comfort level  Outcome: Progressing     Problem: Safety - Adult  Goal: Free from fall injury  Outcome: Progressing     Problem:  Discharge Planning  Goal: Discharge to home or other facility with appropriate resources  Outcome: Progressing     Problem: Chronic Conditions and Co-morbidities  Goal: Patient's chronic conditions and co-morbidity symptoms are monitored and maintained or improved  Outcome: Progressing   The patient's goals for the shift include      The clinical goals for the shift include dressing changes, infection prevention with antibiotics, & pain management

## 2024-07-20 VITALS
WEIGHT: 190 LBS | HEIGHT: 67 IN | BODY MASS INDEX: 29.82 KG/M2 | OXYGEN SATURATION: 97 % | RESPIRATION RATE: 16 BRPM | HEART RATE: 62 BPM | TEMPERATURE: 97.2 F | DIASTOLIC BLOOD PRESSURE: 75 MMHG | SYSTOLIC BLOOD PRESSURE: 134 MMHG

## 2024-07-20 LAB
ALBUMIN SERPL BCP-MCNC: 3.3 G/DL (ref 3.4–5)
ALP SERPL-CCNC: 60 U/L (ref 33–120)
ALT SERPL W P-5'-P-CCNC: 32 U/L (ref 10–52)
ANION GAP SERPL CALC-SCNC: 12 MMOL/L (ref 10–20)
AST SERPL W P-5'-P-CCNC: 22 U/L (ref 9–39)
BILIRUB SERPL-MCNC: 0.4 MG/DL (ref 0–1.2)
BUN SERPL-MCNC: 8 MG/DL (ref 6–23)
CALCIUM SERPL-MCNC: 8.6 MG/DL (ref 8.6–10.3)
CHLORIDE SERPL-SCNC: 102 MMOL/L (ref 98–107)
CO2 SERPL-SCNC: 28 MMOL/L (ref 21–32)
CREAT SERPL-MCNC: 0.96 MG/DL (ref 0.5–1.3)
EGFRCR SERPLBLD CKD-EPI 2021: >90 ML/MIN/1.73M*2
ERYTHROCYTE [DISTWIDTH] IN BLOOD BY AUTOMATED COUNT: 12.6 % (ref 11.5–14.5)
GLUCOSE SERPL-MCNC: 100 MG/DL (ref 74–99)
HCT VFR BLD AUTO: 37.8 % (ref 41–52)
HGB BLD-MCNC: 13 G/DL (ref 13.5–17.5)
MCH RBC QN AUTO: 31.1 PG (ref 26–34)
MCHC RBC AUTO-ENTMCNC: 34.4 G/DL (ref 32–36)
MCV RBC AUTO: 90 FL (ref 80–100)
NRBC BLD-RTO: 0 /100 WBCS (ref 0–0)
PLATELET # BLD AUTO: 292 X10*3/UL (ref 150–450)
POTASSIUM SERPL-SCNC: 3.8 MMOL/L (ref 3.5–5.3)
PROT SERPL-MCNC: 6.4 G/DL (ref 6.4–8.2)
RBC # BLD AUTO: 4.18 X10*6/UL (ref 4.5–5.9)
SODIUM SERPL-SCNC: 138 MMOL/L (ref 136–145)
WBC # BLD AUTO: 9.2 X10*3/UL (ref 4.4–11.3)

## 2024-07-20 PROCEDURE — 2500000004 HC RX 250 GENERAL PHARMACY W/ HCPCS (ALT 636 FOR OP/ED): Performed by: INTERNAL MEDICINE

## 2024-07-20 PROCEDURE — 80053 COMPREHEN METABOLIC PANEL: CPT | Performed by: INTERNAL MEDICINE

## 2024-07-20 PROCEDURE — 2500000001 HC RX 250 WO HCPCS SELF ADMINISTERED DRUGS (ALT 637 FOR MEDICARE OP): Performed by: INTERNAL MEDICINE

## 2024-07-20 PROCEDURE — 2500000004 HC RX 250 GENERAL PHARMACY W/ HCPCS (ALT 636 FOR OP/ED): Performed by: SURGERY

## 2024-07-20 PROCEDURE — 36415 COLL VENOUS BLD VENIPUNCTURE: CPT | Performed by: INTERNAL MEDICINE

## 2024-07-20 PROCEDURE — 99233 SBSQ HOSP IP/OBS HIGH 50: CPT | Performed by: SURGERY

## 2024-07-20 PROCEDURE — 85027 COMPLETE CBC AUTOMATED: CPT | Performed by: INTERNAL MEDICINE

## 2024-07-20 RX ORDER — LINEZOLID 600 MG/1
600 TABLET, FILM COATED ORAL EVERY 12 HOURS SCHEDULED
Qty: 20 TABLET | Refills: 0 | Status: SHIPPED | OUTPATIENT
Start: 2024-07-20 | End: 2024-07-20

## 2024-07-20 RX ORDER — OXYCODONE AND ACETAMINOPHEN 5; 325 MG/1; MG/1
1 TABLET ORAL EVERY 6 HOURS PRN
Qty: 15 TABLET | Refills: 0 | Status: SHIPPED | OUTPATIENT
Start: 2024-07-20 | End: 2024-07-25

## 2024-07-20 RX ORDER — LINEZOLID 600 MG/1
600 TABLET, FILM COATED ORAL EVERY 12 HOURS SCHEDULED
Qty: 20 TABLET | Refills: 0 | Status: SHIPPED | OUTPATIENT
Start: 2024-07-20 | End: 2024-07-30

## 2024-07-20 RX ORDER — LINEZOLID 600 MG/1
600 TABLET, FILM COATED ORAL EVERY 12 HOURS SCHEDULED
Status: DISCONTINUED | OUTPATIENT
Start: 2024-07-20 | End: 2024-07-20 | Stop reason: HOSPADM

## 2024-07-20 RX ORDER — ACETAMINOPHEN 325 MG/1
650 TABLET ORAL EVERY 4 HOURS PRN
Qty: 30 TABLET | Refills: 0 | Status: SHIPPED | OUTPATIENT
Start: 2024-07-20

## 2024-07-20 ASSESSMENT — PAIN SCALES - GENERAL
PAINLEVEL_OUTOF10: 6
PAINLEVEL_OUTOF10: 7
PAINLEVEL_OUTOF10: 8

## 2024-07-20 ASSESSMENT — PAIN DESCRIPTION - ORIENTATION
ORIENTATION: RIGHT
ORIENTATION: RIGHT

## 2024-07-20 ASSESSMENT — PAIN DESCRIPTION - LOCATION
LOCATION: LEG
LOCATION: LEG

## 2024-07-20 NOTE — PROGRESS NOTES
Infectious disease progress note  Subjective   Severe cellulitis right leg resolving nicely  Fluid collection on ct scan    Antibiotics  Zosyn day 6 discontinue  Zyvox day 5 changed to oral    Objective   Range of Vitals (last 24 hours)  Heart Rate:  [45-62]   Temp:  [35.9 °C (96.6 °F)-36.3 °C (97.3 °F)]   Resp:  [16]   BP: (114-134)/(58-75)   SpO2:  [93 %-97 %]   Daily Weight  07/17/24 : 86.2 kg (190 lb)    Body mass index is 29.76 kg/m².      Physical Exam  Patient states he is feeling much better and his leg hurts much less.  He is up walking around.  It is wrapped      Relevant Results  Labs  Lab Results   Component Value Date    WBC 9.2 07/20/2024    HGB 13.0 (L) 07/20/2024    HCT 37.8 (L) 07/20/2024    MCV 90 07/20/2024     07/20/2024     Lab Results   Component Value Date    GLUCOSE 100 (H) 07/20/2024    CALCIUM 8.6 07/20/2024     07/20/2024    K 3.8 07/20/2024    CO2 28 07/20/2024     07/20/2024    BUN 8 07/20/2024    CREATININE 0.96 07/20/2024   ESR: --  Lab Results   Component Value Date    SEDRATE 8 07/15/2024     Lab Results   Component Value Date    CRP 29.92 (H) 07/15/2024     Lab Results   Component Value Date    ALT 32 07/20/2024    AST 22 07/20/2024    ALKPHOS 60 07/20/2024    BILITOT 0.4 07/20/2024       Microbiology  7- blood cultures no growth at 4 days  7- blood cultures no growth at 4 day  7- wound cultures no growth       Imaging  7- CT of right leg shows a developing abscess in the mid anterior leg measuring 3.5 x 1 x 5 cm.  Also early necrotizing fasciitis cannot be excluded     7- 4 repeat CT pending  Assessment/Plan   1.  I discontinued IV Zosyn and IV Zyvox change to oral Zyvox 600 mg p.o. twice daily    2.  Awaiting CT scan results.  If they are negative for any abscess which I think they will be patient is okay to discharge home on oral Zyvox to complete a 14-day treatment course, 8 more days.  I went over the things he cannot eat  and drink and the drug interactions in detail with him    I reviewed and interpreted all lab test imaging studies and documentations from other healthcare providers  I am monitoring for antibiotic side effects and toxicity     Wendy Arnett MD

## 2024-07-20 NOTE — PROGRESS NOTES
"Mac Perez is a 28 y.o. male on day 4 of admission presenting with Cellulitis of right lower extremity.    Subjective   This patient was seen at bedside for follow-up of his cellulitis of his right leg.  He continues to complain of significant pain involving the anterior tibial region of his right lower leg.         Objective     Vitals:    07/19/24 1926   BP: 118/60   Pulse: 59   Resp: 16   Temp: 36.3 °C (97.3 °F)   SpO2: 97%      Physical Exam  This patient is alert and oriented x 3.  He does not appear to be in any acute distress.  Head is normocephalic.  Pupils are equal and reactive to light accommodation.  Neck is soft and supple without palpable lymph nodes.  Heart is regular rate.  Lungs are clear.  Abdomen soft with positive bowel sounds there is no pain on palpation.  Upper extremities and left lower extremities have adequate range of motion.  He is  involving his right anterior tibial region around the site of the incision.  Blood pressure 118/60, pulse 59, temperature 36.3 °C (97.3 °F), temperature source Temporal, resp. rate 16, height 1.702 m (5' 7\"), weight 86.2 kg (190 lb), SpO2 97%.        Intake/Output last 3 Shifts:  I/O last 3 completed shifts:  In: 1600 (18.6 mL/kg) [IV Piggyback:1600]  Out: - (0 mL/kg)   Weight: 86.2 kg     Patient Active Problem List    Diagnosis Date Noted    Abscess of right lower extremity 07/17/2024    Cellulitis of right lower extremity 07/15/2024    ADHD (attention deficit hyperactivity disorder) 11/04/2023    Erectile dysfunction 11/04/2023    Generalized anxiety disorder 11/04/2023    Vitamin D deficiency 11/04/2023    Depression 03/08/2021    Gastroesophageal reflux disease 02/06/2020          Current Facility-Administered Medications:     acetaminophen (Tylenol) tablet 650 mg, 650 mg, oral, q4h PRN, 650 mg at 07/16/24 1736 **OR** acetaminophen (Tylenol) oral liquid 650 mg, 650 mg, oral, q4h PRN, Obed Taylor, DO    acetaminophen (Tylenol) tablet 650 " mg, 650 mg, oral, q6h, Ralf Jackson MD, 650 mg at 07/19/24 2043    enoxaparin (Lovenox) syringe 40 mg, 40 mg, subcutaneous, Daily, Ralf Jackson MD, 40 mg at 07/19/24 0801    HYDROmorphone (Dilaudid) injection 1 mg, 1 mg, intravenous, q4h PRN, Heriberto Shukla MD    linezolid (Zyvox)  mg in 300 mL, 600 mg, intravenous, q12h, Obed Taylor DO, Last Rate: 0 mL/hr at 07/19/24 1203, 600 mg at 07/19/24 2143    ondansetron (Zofran) tablet 4 mg, 4 mg, oral, q6h PRN **OR** ondansetron (Zofran) injection 4 mg, 4 mg, intravenous, q6h PRN, Obed Taylor DO, 4 mg at 07/15/24 1851    oxyCODONE-acetaminophen (Percocet) 5-325 mg per tablet 1 tablet, 1 tablet, oral, q6h PRN, Heriberto Shukla MD, 1 tablet at 07/19/24 2143    piperacillin-tazobactam (Zosyn) 4.5 g in dextrose (iso)  mL, 4.5 g, intravenous, q6h, Obed Taylor DO, Stopped at 07/19/24 1802    polyethylene glycol (Glycolax, Miralax) packet 17 g, 17 g, oral, Daily PRN, Obed Taylor DO, 17 g at 07/18/24 0546     Lab Results   Component Value Date    WBC 9.3 07/18/2024    HGB 12.3 (L) 07/18/2024    HCT 34.9 (L) 07/18/2024     07/18/2024    ALT 45 07/18/2024    AST 25 07/18/2024     (L) 07/18/2024    K 4.1 07/18/2024    CL 98 07/18/2024    CREATININE 0.94 07/18/2024    BUN 9 07/18/2024    CO2 27 07/18/2024       US right upper quadrant    Result Date: 7/17/2024  Interpreted By:  Gefofrey Rodarte, STUDY: US RIGHT UPPER QUADRANT;  7/16/2024 6:35 pm   INDICATION: Signs/Symptoms:elevated LFT's.   COMPARISON: None.   ACCESSION NUMBER(S): [id number]   ORDERING CLINICIAN: GORDY OLVERA   TECHNIQUE: Multiple images of the right upper quadrant were obtained.  The study was somewhat limited due to the patient's body habitus and overlying bowel gas.   FINDINGS: LIVER: The liver measures 18.4 cm and is diffusely echogenic in appearance, consistent with diffuse fatty infiltration. The resulting increased beam attenuation thereby limiting evaluation  of the liver for focal lesions. Within the limitations, no focal lesions are seen.     GALLBLADDER: The gallbladder is nondistended, and demonstrates no evidence of gallstones, wall thickening or surrounding fluid. The gallbladder wall thickness is 0.3 cm. Sonographic Kessler's sign is negative.     BILE DUCTS: No evidence of intra or extrahepatic biliary dilatation is identified; the common bile duct measures 0.6 cm.   PANCREAS: The pancreas is poorly visualized due to overlying bowel gas.   RIGHT KIDNEY: The right kidney measures 10.6 cm in length. The renal cortical echogenicity and thickness are within normal limit.  No hydronephrosis or renal calculi are seen.       Enlarged liver with diffuse fatty infiltration. The pancreas was poorly visualized.   Otherwise, limited, but grossly unremarkable right upper quadrant ultrasound.   MACRO: None   Signed by: Geoffrey Rodarte 7/17/2024 6:12 AM Dictation workstation:   [id number]    CT angio lower extremity right w and or wo IV contrast    Result Date: 7/16/2024  Interpreted By:  Lisa Tristan, STUDY: CT ANGIO LOWER EXTREMITY RIGHT W AND OR WO IV CONTRAST; ;  7/16/2024 8:41 pm   INDICATION: Signs/Symptoms:rule out abcess and necrotizing fasicitis. please do upper thiggh and lower leg..   COMPARISON: Correlation with right femur and tibia fibula radiographs of the same day   ACCESSION NUMBER(S): [id number]   ORDERING CLINICIAN: ANGELA NINO   TECHNIQUE: Thin-section axial postcontrast images of the right lower extremity vasculature from the aortic bifurcation through the toes. Delayed images from the base of the toes were obtained. Images were reconstructed axial, sagittal and coronal planes. 3D and maximum intensity projection images were generated a separate workstation and reviewed   FINDINGS: The right common iliac vein, internal iliac vein, common femoral vein, profunda femoral, femoral vein, popliteal vein, anterior tibial vein, posterior tibial vein, peroneal  vein, dorsalis pedis and plantar branches are patent with no significant stenosis.   There is subcutaneous edema throughout the right lower extremity, mild involving the thigh, moderate around the knee and proximal leg and large amount of edema at the level of the ankle and dorsum of the foot. There is an area of more focal fluid in the anterior mid leg, abutting the deep fascia, measuring approximately 3.5 x 1 x 5 cm. No enhancing capsule.   No soft tissue gas. Intermuscular and intramuscular fat planes are preserved.   There is a multilocular fluid collection abutting the posterior proximal tibiofibular joint, measuring approximately 16 x 6 x 30 mm, the location and appearance is most typical of a capsular ganglion cyst. No knee joint effusion or Baker's cyst.   There is non opacification of a branch of the greater saphenous vein below the level of the knee to the dorsum of the foot (series 606, images 133 through 225). Evaluation of the deep veins is limited on CT, however, no occlusive thrombus is evident on the delayed images, which are obtained from the level of the popliteal vein through the feet.   Mild enlarged right inguinal lymph nodes are likely reactive. Imaged intrapelvic structures are unremarkable.   No osseous destruction or abnormal periosteal bone formation. No acute fracture or malalignment.       Diffuse skin thickening and subcutaneous edema throughout the right lower extremity, most significantly involving the lower leg and ankle. While there is no soft tissue gas, early necrotizing fasciitis cannot be excluded by imaging, and clinical correlation is necessary.   Suspected developing abscess in the mid anterior leg measuring approximately 3.5 x 1 x 5 cm.   Suspected acute thrombophlebitis of a greater saphenous vein branch below the knee.   No acute osseous abnormality.     MACRO: None   Signed by: Lisa Tristan 7/16/2024 10:44 PM Dictation workstation:   [id number]    XR femur right 2+  views    Result Date: 7/15/2024  Interpreted By:  Cynthia Keita, STUDY: XR FEMUR RIGHT 2+ VIEWS; XR TIBIA FIBULA RIGHT 2 VIEWS;  7/15/2024 12:57 pm   INDICATION: Signs/Symptoms:edema erythema cellulitis.   COMPARISON: None.   ACCESSION NUMBER(S): [id number]; [id number]   ORDERING CLINICIAN: BHARATHI MALDONADO   FINDINGS: No acute fracture or osseous destruction. Imaged hip, knee and ankle joints are grossly unremarkable. No knee joint effusion. Diffuse subcutaneous edema more pronounced in the lower leg.       No acute osseous abnormality.   MACRO None   Signed by: Cynthia Keita 7/15/2024 1:17 PM Dictation workstation:   [id number]    XR tibia fibula right 2 views    Result Date: 7/15/2024  Interpreted By:  Cynthia Keita, STUDY: XR FEMUR RIGHT 2+ VIEWS; XR TIBIA FIBULA RIGHT 2 VIEWS;  7/15/2024 12:57 pm   INDICATION: Signs/Symptoms:edema erythema cellulitis.   COMPARISON: None.   ACCESSION NUMBER(S): [id number]; [id number]   ORDERING CLINICIAN: BHARATHI MALDONADO   FINDINGS: No acute fracture or osseous destruction. Imaged hip, knee and ankle joints are grossly unremarkable. No knee joint effusion. Diffuse subcutaneous edema more pronounced in the lower leg.       No acute osseous abnormality.   MACRO None   Signed by: Cynthia Keita 7/15/2024 1:17 PM Dictation workstation:   [id number]                Assessment/Plan   Principal Problem:    Cellulitis of right lower extremity  Active Problems:    Abscess of right lower extremity      This patient was seen for follow-up evaluation of his extensive cellulitis of his right leg.  He continues to complain of significant pain involving the anterior tibial region on the right.  This is in the area of the incision.  I do feel that he needs a repeat CT scan of his right leg.      Obed Taylor, DO

## 2024-07-20 NOTE — DISCHARGE SUMMARY
Discharge Diagnosis  Cellulitis of right lower extremity    Issues Requiring Follow-Up  Patient fully evaluated 7/20, significant clinical improvement noted, patient with less swelling and erythema present. Patient medically cleared for discharge today. Patient alert, awake, and smiling in bed, denies acute difficulties at this time, medications and labs reviewed, continue current and repeat labs in the AM if patient still hospitalized. Patient to continue with dry sterile dressing to site, Zyvox PO x 7days and follow up with Dr. Bain in 1 week . Patient aware and agreeable to current plan, continue plan as above. I spent 30 minutes in the professional and overall care of this patient.    Discharge Meds     Your medication list        START taking these medications        Instructions Last Dose Given Next Dose Due   acetaminophen 325 mg tablet  Commonly known as: Tylenol      Take 2 tablets (650 mg) by mouth every 4 hours if needed for mild pain (1 - 3).       linezolid 600 mg tablet  Commonly known as: Zyvox      Take 1 tablet (600 mg) by mouth every 12 hours for 10 days.       oxyCODONE-acetaminophen 5-325 mg tablet  Commonly known as: Percocet      Take 1 tablet by mouth every 6 hours if needed for severe pain (7 - 10) for up to 5 days.              CONTINUE taking these medications        Instructions Last Dose Given Next Dose Due   sertraline 100 mg tablet  Commonly known as: Zoloft      Take 1 tablet (100 mg) by mouth once daily.              STOP taking these medications      cephalexin 500 mg capsule  Commonly known as: Keflex        HYDROcodone-acetaminophen 5-325 mg tablet  Commonly known as: Norco        sulfamethoxazole-trimethoprim 800-160 mg tablet  Commonly known as: Bactrim DS                  Where to Get Your Medications        These medications were sent to [id number] - [place], [place] - [address]  [address] [id number]      Phone: [phone]    acetaminophen 325 mg tablet  linezolid 600 mg tablet  oxyCODONE-acetaminophen 5-325 mg tablet         Test Results Pending At Discharge  Pending Labs       No current pending labs.            Hospital Course         Heriberto Shukla MD   Physician  Internal Medicine     Progress Notes     Signed     Date of Service: 7/19/2024  3:20 PM     Signed       Expand All Collapse All    Mac Perez is a 28 y.o. male on day 4 of admission presenting with Cellulitis of right lower extremity.           Subjective  Patient seen and examined by me.  Patient is resting in bed and states he still has mild to moderate pain and wants another IV Dilaudid this evening and after that he states it can be discontinued and he will manage with p.o. tramadol overnight.  Patient is awake alert oriented x 3.  His right leg swelling and right thigh swelling is much improving.  Patient is being followed by Dr. Taylor/vascular surgery s/p I&D of the abscess from his right calf.  Patient remains on IV Zyvox and IV Zosyn.  Vitals and labs noted.  Patient is afebrile.  His LFTs have resolved and improved.              Objective  Last Recorded Vitals  /61 (BP Location: Right arm, Patient Position: Lying)   Pulse 67   Temp 36.5 °C (97.7 °F) (Temporal)   Resp 16   Wt 86.2 kg (190 lb)   SpO2 98%   Intake/Output last 3 Shifts:     Intake/Output Summary (Last 24 hours) at 7/19/2024 1520  Last data filed at 7/19/2024 1232      Gross per 24 hour   Intake 1000 ml   Output --   Net 1000 ml         Admission Weight  Weight: 86.2 kg (190 lb) (07/15/24 1127)     Daily Weight  07/17/24 : 86.2 kg (190 lb)     Image Results  US right upper quadrant  Narrative: Interpreted By:  Geoffrey Rodarte,   STUDY:  US RIGHT UPPER QUADRANT;  7/16/2024 6:35 pm      INDICATION:  Signs/Symptoms:elevated LFT's.      COMPARISON:  None.      ACCESSION NUMBER(S):  [id number]      ORDERING CLINICIAN:  GORDY OLVERA      TECHNIQUE:  Multiple images of the right upper  quadrant were obtained.  The study  was somewhat limited due to the patient's body habitus and overlying  bowel gas.      FINDINGS:  LIVER:  The liver measures 18.4 cm and is diffusely echogenic in appearance,  consistent with diffuse fatty infiltration. The resulting increased  beam attenuation thereby limiting evaluation of the liver for focal  lesions. Within the limitations, no focal lesions are seen.          GALLBLADDER:  The gallbladder is nondistended, and demonstrates no evidence of  gallstones, wall thickening or surrounding fluid. The gallbladder  wall thickness is 0.3 cm. Sonographic Kessler's sign is negative.          BILE DUCTS:  No evidence of intra or extrahepatic biliary dilatation is  identified; the common bile duct measures 0.6 cm.      PANCREAS:  The pancreas is poorly visualized due to overlying bowel gas.      RIGHT KIDNEY:  The right kidney measures 10.6 cm in length. The renal cortical  echogenicity and thickness are within normal limit.  No  hydronephrosis or renal calculi are seen.      Impression: Enlarged liver with diffuse fatty infiltration. The pancreas was  poorly visualized.      Otherwise, limited, but grossly unremarkable right upper quadrant  ultrasound.      MACRO:  None      Signed by: Geoffrey Rodarte 7/17/2024 6:12 AM  Dictation workstation:   [id number]        Physical Exam  Vitals and nursing note reviewed.   Constitutional:       General: He is not in acute distress.     Appearance: Normal appearance. He is normal weight. He is not ill-appearing or toxic-appearing.   HENT:      Head: Normocephalic and atraumatic.      Right Ear: Tympanic membrane and ear canal normal. There is no impacted cerumen.      Left Ear: Tympanic membrane, ear canal and external ear normal.      Nose: Nose normal. No congestion or rhinorrhea.      Mouth/Throat:      Pharynx: Oropharynx is clear. No oropharyngeal exudate or posterior oropharyngeal erythema.   Eyes:      General: No scleral icterus.         Right eye: No discharge.         Left eye: No discharge.      Extraocular Movements: Extraocular movements intact.      Conjunctiva/sclera: Conjunctivae normal.      Pupils: Pupils are equal, round, and reactive to light.   Neck:      Vascular: No carotid bruit.   Cardiovascular:      Rate and Rhythm: Normal rate and regular rhythm.      Pulses: Normal pulses.      Heart sounds: Normal heart sounds. No murmur heard.     No gallop.   Pulmonary:      Effort: Pulmonary effort is normal. No respiratory distress.      Breath sounds: Normal breath sounds. No wheezing, rhonchi or rales.   Abdominal:      General: Abdomen is flat. Bowel sounds are normal. There is no distension.      Palpations: Abdomen is soft. There is no mass.      Tenderness: There is no abdominal tenderness. There is no right CVA tenderness, left CVA tenderness, guarding or rebound.      Hernia: No hernia is present.   Musculoskeletal:         General: No swelling. Normal range of motion.      Cervical back: Normal range of motion and neck supple. No rigidity.      Left lower leg: Tenderness present. No edema.      Comments: Right thigh and leg and knee swelling and erythema is much better.  Patient is s/p I&D of the right calf abscess.   Lymphadenopathy:      Cervical: No cervical adenopathy.   Skin:     General: Skin is warm.      Coloration: Skin is not jaundiced.      Findings: No erythema or rash.   Neurological:      General: No focal deficit present.      Mental Status: He is alert and oriented to person, place, and time. Mental status is at baseline.      Sensory: No sensory deficit.      Motor: No weakness.      Gait: Gait normal.      Deep Tendon Reflexes: Reflexes normal.   Psychiatric:         Mood and Affect: Mood normal.         Thought Content: Thought content normal.         Judgment: Judgment normal.            Relevant Results       Current Medications      Current Facility-Administered Medications:     acetaminophen (Tylenol)  tablet 650 mg, 650 mg, oral, q4h PRN, 650 mg at 07/16/24 1736 **OR** acetaminophen (Tylenol) oral liquid 650 mg, 650 mg, oral, q4h PRN, Obed Taylor DO    acetaminophen (Tylenol) tablet 650 mg, 650 mg, oral, q6h, Ralf Jackson MD, 650 mg at 07/19/24 1401    enoxaparin (Lovenox) syringe 40 mg, 40 mg, subcutaneous, Daily, Ralf Jackson MD, 40 mg at 07/19/24 0801    HYDROmorphone (Dilaudid) injection 1 mg, 1 mg, intravenous, q4h PRN, Obed Taylor DO, 1 mg at 07/19/24 1211    linezolid (Zyvox)  mg in 300 mL, 600 mg, intravenous, q12h, Obed Taylor DO, Stopped at 07/19/24 1203    ondansetron (Zofran) tablet 4 mg, 4 mg, oral, q6h PRN **OR** ondansetron (Zofran) injection 4 mg, 4 mg, intravenous, q6h PRN, Obed Taylor DO, 4 mg at 07/15/24 1851    oxyCODONE-acetaminophen (Percocet) 5-325 mg per tablet 1 tablet, 1 tablet, oral, q6h PRN, Heriberto Shukla MD    piperacillin-tazobactam (Zosyn) 4.5 g in dextrose (iso)  mL, 4.5 g, intravenous, q6h, Obed Taylor DO, Stopped at 07/19/24 1259    polyethylene glycol (Glycolax, Miralax) packet 17 g, 17 g, oral, Daily PRN, Obed Taylor DO, 17 g at 07/18/24 0546      No results found for this or any previous visit (from the past 24 hour(s)).   US right upper quadrant     Result Date: 7/17/2024  Interpreted By:  Geoffrey Rodarte, STUDY: US RIGHT UPPER QUADRANT;  7/16/2024 6:35 pm   INDICATION: Signs/Symptoms:elevated LFT's.   COMPARISON: None.   ACCESSION NUMBER(S): [id number]   ORDERING CLINICIAN: GORDY OLVERA   TECHNIQUE: Multiple images of the right upper quadrant were obtained.  The study was somewhat limited due to the patient's body habitus and overlying bowel gas.   FINDINGS: LIVER: The liver measures 18.4 cm and is diffusely echogenic in appearance, consistent with diffuse fatty infiltration. The resulting increased beam attenuation thereby limiting evaluation of the liver for focal lesions. Within the limitations, no focal lesions are seen.      GALLBLADDER: The gallbladder is nondistended, and demonstrates no evidence of gallstones, wall thickening or surrounding fluid. The gallbladder wall thickness is 0.3 cm. Sonographic Kessler's sign is negative.     BILE DUCTS: No evidence of intra or extrahepatic biliary dilatation is identified; the common bile duct measures 0.6 cm.   PANCREAS: The pancreas is poorly visualized due to overlying bowel gas.   RIGHT KIDNEY: The right kidney measures 10.6 cm in length. The renal cortical echogenicity and thickness are within normal limit.  No hydronephrosis or renal calculi are seen.        Enlarged liver with diffuse fatty infiltration. The pancreas was poorly visualized.   Otherwise, limited, but grossly unremarkable right upper quadrant ultrasound.   MACRO: None   Signed by: Geoffrey Rodarte 7/17/2024 6:12 AM Dictation workstation:   [id number]     CT angio lower extremity right w and or wo IV contrast     Result Date: 7/16/2024  Interpreted By:  Lisa Tristan, STUDY: CT ANGIO LOWER EXTREMITY RIGHT W AND OR WO IV CONTRAST; ;  7/16/2024 8:41 pm   INDICATION: Signs/Symptoms:rule out abcess and necrotizing fasicitis. please do upper thiggh and lower leg..   COMPARISON: Correlation with right femur and tibia fibula radiographs of the same day   ACCESSION NUMBER(S): [id number]   ORDERING CLINICIAN: ANGELA NINO   TECHNIQUE: Thin-section axial postcontrast images of the right lower extremity vasculature from the aortic bifurcation through the toes. Delayed images from the base of the toes were obtained. Images were reconstructed axial, sagittal and coronal planes. 3D and maximum intensity projection images were generated a separate workstation and reviewed   FINDINGS: The right common iliac vein, internal iliac vein, common femoral vein, profunda femoral, femoral vein, popliteal vein, anterior tibial vein, posterior tibial vein, peroneal vein, dorsalis pedis and plantar branches are patent with no significant stenosis.    There is subcutaneous edema throughout the right lower extremity, mild involving the thigh, moderate around the knee and proximal leg and large amount of edema at the level of the ankle and dorsum of the foot. There is an area of more focal fluid in the anterior mid leg, abutting the deep fascia, measuring approximately 3.5 x 1 x 5 cm. No enhancing capsule.   No soft tissue gas. Intermuscular and intramuscular fat planes are preserved.   There is a multilocular fluid collection abutting the posterior proximal tibiofibular joint, measuring approximately 16 x 6 x 30 mm, the location and appearance is most typical of a capsular ganglion cyst. No knee joint effusion or Baker's cyst.   There is non opacification of a branch of the greater saphenous vein below the level of the knee to the dorsum of the foot (series 606, images 133 through 225). Evaluation of the deep veins is limited on CT, however, no occlusive thrombus is evident on the delayed images, which are obtained from the level of the popliteal vein through the feet.   Mild enlarged right inguinal lymph nodes are likely reactive. Imaged intrapelvic structures are unremarkable.   No osseous destruction or abnormal periosteal bone formation. No acute fracture or malalignment.        Diffuse skin thickening and subcutaneous edema throughout the right lower extremity, most significantly involving the lower leg and ankle. While there is no soft tissue gas, early necrotizing fasciitis cannot be excluded by imaging, and clinical correlation is necessary.   Suspected developing abscess in the mid anterior leg measuring approximately 3.5 x 1 x 5 cm.   Suspected acute thrombophlebitis of a greater saphenous vein branch below the knee.   No acute osseous abnormality.     MACRO: None   Signed by: Lisa Tristan 7/16/2024 10:44 PM Dictation workstation:   [id number]     XR femur right 2+ views     Result Date: 7/15/2024  Interpreted By:  Cynthia Keita, STUDY: XR FEMUR  RIGHT 2+ VIEWS; XR TIBIA FIBULA RIGHT 2 VIEWS;  7/15/2024 12:57 pm   INDICATION: Signs/Symptoms:edema erythema cellulitis.   COMPARISON: None.   ACCESSION NUMBER(S): [id number]; [id number]   ORDERING CLINICIAN: BHARATHI MALDONADO   FINDINGS: No acute fracture or osseous destruction. Imaged hip, knee and ankle joints are grossly unremarkable. No knee joint effusion. Diffuse subcutaneous edema more pronounced in the lower leg.        No acute osseous abnormality.   MACRO None   Signed by: Cynthia Keita 7/15/2024 1:17 PM Dictation workstation:   [id number]     XR tibia fibula right 2 views     Result Date: 7/15/2024  Interpreted By:  Cynthia Keita, STUDY: XR FEMUR RIGHT 2+ VIEWS; XR TIBIA FIBULA RIGHT 2 VIEWS;  7/15/2024 12:57 pm   INDICATION: Signs/Symptoms:edema erythema cellulitis.   COMPARISON: None.   ACCESSION NUMBER(S): [id number]; [id number]   ORDERING CLINICIAN: BHARATHI MALDONADO   FINDINGS: No acute fracture or osseous destruction. Imaged hip, knee and ankle joints are grossly unremarkable. No knee joint effusion. Diffuse subcutaneous edema more pronounced in the lower leg.        No acute osseous abnormality.   MACRO None   Signed by: Cynthia Keita 7/15/2024 1:17 PM Dictation workstation:   [id number]     Vascular US lower extremity venous duplex right     Result Date: 7/14/2024  Interpreted By:  Lisa Tristan, STUDY: VASC US LOWER EXTREMITY VENOUS DUPLEX RIGHT;  7/14/2024 12:00 am   INDICATION: Signs/Symptoms:swelling rle.   COMPARISON: None.   ACCESSION NUMBER(S): [id number]   ORDERING CLINICIAN: DONNA STANLEY   TECHNIQUE: Grayscale, color and spectral Doppler sonographic images of the right lower extremity deep venous system. The left common femoral vein was imaged for comparison.   FINDINGS: There is normal compressibility of the right common femoral vein, saphenous femoral junction, femoral vein and popliteal vein. The posterior tibial and peroneal veins demonstrate normal color flow and  compressibility. There is normal spontaneous and phasic variation throughout the leg by spectral doppler.   The left common femoral vein is patent.   OTHER FINDINGS: None.        No sonographic evidence of acute DVT in the right lower extremity.       MACRO: None   Signed by: Lisa Tristan 7/14/2024 12:52 AM Dictation workstation:   [id number]     XR chest 1 view     Result Date: 7/13/2024  Interpreted By:  Lisa Tristan, STUDY: XR CHEST 1 VIEW;  7/13/2024 10:42 pm   INDICATION: Signs/Symptoms:sob.   COMPARISON: None.   ACCESSION NUMBER(S): [id number]   ORDERING CLINICIAN: DONNA STANLEY   FINDINGS:     CARDIOMEDIASTINAL SILHOUETTE: Cardiomediastinal silhouette is normal in size and configuration.   LUNGS: No pulmonary consolidation, pleural effusion or pneumothorax.   ABDOMEN: No remarkable upper abdominal findings.   BONES: No acute osseous abnormality.        No acute cardiopulmonary process.   MACRO: None   Signed by: Lisa Tristan 7/13/2024 11:19 PM Dictation workstation:   [id number]                Assessment/Plan   #1/Right leg and thigh severe cellulitis/s/p poison ivy rash/right calf abscess which required I&D by vascular surgery/patient remains on IV Zosyn and IV Zyvox.  His leukocytosis has improved.  Patient is afebrile.  Right leg swelling and erythema is improving.  Possible discharge in the next 24 to 48 hours once cleared by vascular surgery and ID decides on p.o. antibiotics.  Patient is ambulating on his right leg and states that his IV Dilaudid can be discontinued after this evening's dose and he will try to manage with p.o. tramadol and still has moderate pain.  Clinically patient is doing much better.  #2/acute transaminitis and elevated LFTs which have resolved.  Ultrasound of the abdomen showed fatty liver.  Patient has been advised on low-cholesterol diet and losing weight.  3./DVT prophylaxis patient remains on subcu Lovenox for DVT prophylaxis.  Possible discharge in the next 24 to  48 hours.  Further p.o. antibiotics as per ID.  As per vascular surgery patient will follow in the wound center with Dr. Taylor as per his instructions on discharge.  Labs and vitals noted and reviewed.  Consultation notes reviewed and noted.  Medications reconciled.  Total time spent 20 minutes/time spent on patient interaction/counseling/assessment and plan and documentation.        Principal Problem:    Cellulitis of right lower extremity  Active Problems:    Abscess of right lower extremity  Fully evaluated and case reviewed,  continue present antibiotics,  repeat ct scan.  Percocet for pain           Heriberto Shukla MD                         Pertinent Physical Exam At Time of Discharge  Physical Exam    Outpatient Follow-Up  No future appointments.    Patient fully evaluated 7/20, significant clinical improvement noted, patient with less swelling and erythema present. Patient medically cleared for discharge today. Patient alert, awake, and smiling in bed, denies acute difficulties at this time, medications and labs reviewed, continue current and repeat labs in the AM if patient still hospitalized. Patient to continue with dry sterile dressing to site, Zyvox PO x 7days and follow up with Dr. Bain in 1 week . Patient aware and agreeable to current plan, continue plan as above. I spent 30 minutes in the professional and overall care of this patient.  Heriberto Shukla MD

## 2024-07-20 NOTE — CARE PLAN
The patient's goals for the shift include    Problem: Pain  Goal: Takes deep breaths with improved pain control throughout the shift  Outcome: Progressing  Goal: Turns in bed with improved pain control throughout the shift  Outcome: Progressing  Goal: Walks with improved pain control throughout the shift  Outcome: Progressing  Goal: Performs ADL's with improved pain control throughout shift  Outcome: Progressing  Goal: Participates in PT with improved pain control throughout the shift  Outcome: Progressing  Goal: Free from opioid side effects throughout the shift  Outcome: Progressing  Goal: Free from acute confusion related to pain meds throughout the shift  Outcome: Progressing       The clinical goals for the shift include maintian pain at tolerable level    X4 VSS. Pain well controlled with prn percocet. Dressing changed and antibiotics administered

## 2024-07-20 NOTE — PROGRESS NOTES
Mac Perez is a 28 y.o. male on day 5 of admission presenting with Cellulitis of right lower extremity.    Subjective   Patient resting comfortably in bed with his right leg elevated.  He states his right leg pain has significantly improved since yesterday.  He states he has been up and walking around without issue.  He has only had 1 Percocet today and no IV pain medications, and states his leg feels good.  He states he would really like to go home today.       Objective     Physical Exam  Vitals reviewed.   Constitutional:       General: He is not in acute distress.     Appearance: Normal appearance. He is normal weight.   HENT:      Head: Normocephalic and atraumatic.   Eyes:      Extraocular Movements: Extraocular movements intact.      Conjunctiva/sclera: Conjunctivae normal.   Neck:      Vascular: No carotid bruit.   Cardiovascular:      Rate and Rhythm: Normal rate and regular rhythm.      Pulses:           Femoral pulses are 2+ on the right side and 2+ on the left side.       Dorsalis pedis pulses are 2+ on the right side and 2+ on the left side.        Posterior tibial pulses are 2+ on the right side and 2+ on the left side.      Heart sounds: Normal heart sounds.   Pulmonary:      Effort: Pulmonary effort is normal.      Breath sounds: Normal breath sounds.   Abdominal:      General: Abdomen is flat.      Palpations: Abdomen is soft.   Musculoskeletal:         General: No swelling or tenderness. Normal range of motion.      Cervical back: Normal range of motion and neck supple. No tenderness.   Skin:     General: Skin is warm and dry.      Capillary Refill: Capillary refill takes less than 2 seconds.      Comments: Right leg wound with clean base, no active drainage; minimal tenderness on palpation   Neurological:      General: No focal deficit present.      Mental Status: He is alert and oriented to person, place, and time.      Cranial Nerves: No cranial nerve deficit.      Sensory: No sensory  "deficit.      Motor: No weakness.   Psychiatric:         Mood and Affect: Mood normal.         Behavior: Behavior normal.         Last Recorded Vitals  Blood pressure 134/75, pulse 62, temperature 36.2 °C (97.2 °F), temperature source Temporal, resp. rate 16, height 1.702 m (5' 7\"), weight 86.2 kg (190 lb), SpO2 97%.  Intake/Output last 3 Shifts:  I/O last 3 completed shifts:  In: 1000 (11.6 mL/kg) [IV Piggyback:1000]  Out: - (0 mL/kg)   Weight: 86.2 kg     Relevant Results      Current Facility-Administered Medications:     acetaminophen (Tylenol) tablet 650 mg, 650 mg, oral, q4h PRN, 650 mg at 07/16/24 1736 **OR** acetaminophen (Tylenol) oral liquid 650 mg, 650 mg, oral, q4h PRN, Obed Taylor DO    acetaminophen (Tylenol) tablet 650 mg, 650 mg, oral, q6h, Ralf Jackson MD, 650 mg at 07/20/24 0751    enoxaparin (Lovenox) syringe 40 mg, 40 mg, subcutaneous, Daily, Ralf Jackson MD, 40 mg at 07/20/24 0752    HYDROmorphone (Dilaudid) injection 1 mg, 1 mg, intravenous, q4h PRN, Heriberto Shukla MD    linezolid (Zyvox) tablet 600 mg, 600 mg, oral, q12h DENISSE, Wendy Arnett MD    ondansetron (Zofran) tablet 4 mg, 4 mg, oral, q6h PRN **OR** ondansetron (Zofran) injection 4 mg, 4 mg, intravenous, q6h PRN, Obed Taylor DO, 4 mg at 07/15/24 4251    oxyCODONE-acetaminophen (Percocet) 5-325 mg per tablet 1 tablet, 1 tablet, oral, q6h PRN, Heriberto Shukla MD, 1 tablet at 07/20/24 1357    polyethylene glycol (Glycolax, Miralax) packet 17 g, 17 g, oral, Daily PRN, Obed Taylor DO, 17 g at 07/18/24 0546       Results for orders placed or performed during the hospital encounter of 07/15/24 (from the past 24 hour(s))   CBC   Result Value Ref Range    WBC 9.2 4.4 - 11.3 x10*3/uL    nRBC 0.0 0.0 - 0.0 /100 WBCs    RBC 4.18 (L) 4.50 - 5.90 x10*6/uL    Hemoglobin 13.0 (L) 13.5 - 17.5 g/dL    Hematocrit 37.8 (L) 41.0 - 52.0 %    MCV 90 80 - 100 fL    MCH 31.1 26.0 - 34.0 pg    MCHC 34.4 32.0 - 36.0 g/dL    RDW 12.6 11.5 - " 14.5 %    Platelets 292 150 - 450 x10*3/uL   Comprehensive Metabolic Panel   Result Value Ref Range    Glucose 100 (H) 74 - 99 mg/dL    Sodium 138 136 - 145 mmol/L    Potassium 3.8 3.5 - 5.3 mmol/L    Chloride 102 98 - 107 mmol/L    Bicarbonate 28 21 - 32 mmol/L    Anion Gap 12 10 - 20 mmol/L    Urea Nitrogen 8 6 - 23 mg/dL    Creatinine 0.96 0.50 - 1.30 mg/dL    eGFR >90 >60 mL/min/1.73m*2    Calcium 8.6 8.6 - 10.3 mg/dL    Albumin 3.3 (L) 3.4 - 5.0 g/dL    Alkaline Phosphatase 60 33 - 120 U/L    Total Protein 6.4 6.4 - 8.2 g/dL    AST 22 9 - 39 U/L    Bilirubin, Total 0.4 0.0 - 1.2 mg/dL    ALT 32 10 - 52 U/L        CT angio lower extremity right w and or wo IV contrast    Result Date: 7/20/2024  Interpreted By:  Ariana Jaramillo, STUDY: CT ANGIO LOWER EXTREMITY RIGHT W AND OR WO IV CONTRAST;  7/19/2024 8:34 pm   INDICATION: Signs/Symptoms:incision.   COMPARISON: None.   ACCESSION NUMBER(S): [id number]   ORDERING CLINICIAN: DUTCH MEHTA   TECHNIQUE: Thin-section axial images of the right lower extremity in the arterial phase after intravenous administration of lower osmolar agent 75 ml of Omnipaque 350 contrast. Delayed venous phase images were also performed.       For optimization of anatomic evaluation, multiplanar reconstruction, maximum intensity projections, and advanced 3-D off-line postprocessing were performed on a dedicated stand-alone workstation by the interpreting physician.   FINDINGS: VASCULATURE:   ABDOMINAL AORTA: The visualized segment of the abdominal aorta and iliac arteries demonstrate no gross finding     RIGHT LOWER EXTREMITY:   - Right Common Iliac Artery:  No atherosclerosis No hemodynamically significant stenosis. - Right External Iliac Artery:  No atherosclerosis No hemodynamically significant stenosis. - Right Internal Iliac Artery:  No atherosclerosis No hemodynamically significant stenosis. - Common Femoral Artery:  No atherosclerosis No hemodynamically significant stenosis. - Profunda  Artery:  No atherosclerosis No hemodynamically significant stenosis. - Superficial Femoral Artery:  No atherosclerosis No hemodynamically significant stenosis. - Popliteal Artery:  No atherosclerosis No hemodynamically significant stenosis. - Anterior Tibial:  No atherosclerosis No hemodynamically significant stenosis. Patent to foot - Posterior Tibial:  No atherosclerosis No hemodynamically significant stenosis. At the level of the mid tibial shaft, a mm branch of the posterior tibial artery just below the medial injury appears to be supplying a venous collateral causing early contamination and flow within the venous structures. - Peroneal Arteries:  No atherosclerosis No hemodynamically significant stenosis. Patent to foot   Soft tissue defect involving the medial aspect of the right lower extremity, at the mid/distal tibial shaft. It predominantly involve the fat with minimal involvement of the musculature. No gross evidence of radiopaque retained foreign body is seen. No definite evidence of osseous injury is seen.   Trace amount of fluid is noted in the right pericolic gutter. Partially visualized on the current examination.           No gross evidence of arterial injury is seen. 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   Partial visualization of trace amount of free fluid in the right pericolic gutter.     Signed by: Ariana Jaramillo 7/20/2024 1:02 PM Dictation workstation:   [id number]               Assessment/Plan   Principal Problem:    Cellulitis of right lower extremity  Active Problems:    Abscess of right lower extremity    27yo male with cellulitis of the right lower extremity.  He did have an I&D of the leg earlier this week with minimal drainage expressed.  I did perform bedside ultrasound which reveals some inflammatory stranding questionable small fluid collection anterior to the tibia and the mid calf.   However repeat CTA of the leg reveals some soft tissue inflammation but no definite abscess collection.  Given that patient's symptoms are significantly improved, I would not recommend any further surgical intervention.  He is being transition from IV to oral antibiotics by the ID team.  This should be enough to cover his cellulitis at this time.  Patient is stable for discharge from a vascular standpoint.  He is to follow-up in the office with Dr. Taylor for postoperative monitoring.       I spent 60 minutes in the professional and overall care of this patient.      Nallely Perez MD

## 2024-07-22 ENCOUNTER — PATIENT OUTREACH (OUTPATIENT)
Dept: CARE COORDINATION | Facility: CLINIC | Age: 29
End: 2024-07-22
Payer: COMMERCIAL

## 2024-07-22 NOTE — PROGRESS NOTES
[facility]  Admitted 7/15/2024  Discharged 7/20/2024  Dx: Severe Cellulitis Right thigh, leg, and foot.  7/17/2024: Abscess of right lower extremity s/p: incision and drainage of abscess  Discharged on Tylenol, Zyvox, Percocet.    Outreach call to patient to support a smooth transition of care from recent admission.  Spoke with patient, he states, he just spoke with another nurse from  asking him the same questions. CM deferred initial assessment questions. Enrolled patient in [employer] chatbot for additional support and education through transition period.  Will continue to monitor through transition period.    Temitope Mcdaniels RN/CM   [facility]  [phone]

## 2024-07-25 ENCOUNTER — OFFICE VISIT (OUTPATIENT)
Dept: WOUND CARE | Facility: CLINIC | Age: 29
End: 2024-07-25
Payer: COMMERCIAL

## 2024-07-25 DIAGNOSIS — L97.912 ULCER OF RIGHT LEG, WITH FAT LAYER EXPOSED (MULTI): Primary | ICD-10-CM

## 2024-07-25 PROCEDURE — 99213 OFFICE O/P EST LOW 20 MIN: CPT | Mod: 25

## 2024-07-25 PROCEDURE — 11042 DBRDMT SUBQ TIS 1ST 20SQCM/<: CPT

## 2024-07-25 RX ORDER — OXYCODONE AND ACETAMINOPHEN 5; 325 MG/1; MG/1
1 TABLET ORAL EVERY 6 HOURS PRN
Qty: 20 TABLET | Refills: 0 | Status: SHIPPED | OUTPATIENT
Start: 2024-07-25 | End: 2024-08-01

## 2024-07-25 RX ORDER — OXYCODONE AND ACETAMINOPHEN 5; 325 MG/1; MG/1
1 TABLET ORAL EVERY 6 HOURS PRN
Qty: 20 TABLET | Refills: 0 | Status: SHIPPED | OUTPATIENT
Start: 2024-07-25 | End: 2024-07-25 | Stop reason: ALTCHOICE

## 2024-07-29 NOTE — DOCUMENTATION CLARIFICATION NOTE
PATIENT:               [id number]  MRN:                       [id number]  :                       1995  ADMIT DATE:       7/15/2024 12:01 PM  DISCH DATE:        2024 4:54 PM  RESPONDING PROVIDER #:        [id number]          PROVIDER RESPONSE TEXT:    Excisional debridement down to and including subcutaneous tissue and fascia    CDI QUERY TEXT:    Clarification        Instruction:    Based on your assessment of the patient and the clinical information, please provide the requested documentation by clicking on the appropriate radio button and enter any additional information if prompted.    Question: Please further clarify the debridement of the right thigh procedure as    When answering this query, please exercise your independent professional judgment. The fact that a question is being asked, does not imply that any particular answer is desired or expected.    The patient's clinical indicators include:  Clinical Information: This query refers to the procedure performed on 24.    24 OP note: ?The entire right lower extremity from the mid thigh down to the toes was cleaned and prepped in usual sterile fashion.  A 5 cm longitudinal incision was made over the the area.  Dissection was carried down through subcutaneous tissue and down to the fascia.  A small longitudinal incision was made through the fascia.  The muscle beneath the fascia appeared to be normal.  The wound was then thoroughly debrided with Misonix device.?  Options provided:  -- Excisional debridement to and including skin  -- Excisional debridement down to and including subcutaneous tissue and fascia  -- Excisional debridement down to and including muscle  -- Excisional debridement down to and including bone, Please specify bone involved below  -- Non-excisional debridement to and including skin  -- Non-excisional debridement down to and including subcutaneous tissue and fascia  --  Non-excisional debridement down to and including muscle  -- Non-excisional debridement down to and including bone, Please specify bone involved below  -- Other - I will add my own diagnosis  -- Refer to Clinical Documentation Reviewer    Query created by: Dora Kumar on 7/29/2024 8:18 AM      Electronically signed by:  WENDY CINTRON DO 7/29/2024 6:58 PM

## 2024-07-30 ENCOUNTER — PATIENT OUTREACH (OUTPATIENT)
Dept: CARE COORDINATION | Facility: CLINIC | Age: 29
End: 2024-07-30
Payer: COMMERCIAL

## 2024-07-30 NOTE — PROGRESS NOTES
Outreach call to patient following up on appointment with wound clinic. Left voicemail message with CM name and contact number.  Will continue to follow.      Temitope Mcdaniels RN/ABIMAEL  [facility]  [phone]

## 2024-08-01 ENCOUNTER — OFFICE VISIT (OUTPATIENT)
Dept: WOUND CARE | Facility: CLINIC | Age: 29
End: 2024-08-01
Payer: COMMERCIAL

## 2024-08-01 PROCEDURE — 99213 OFFICE O/P EST LOW 20 MIN: CPT

## 2024-08-08 ENCOUNTER — OFFICE VISIT (OUTPATIENT)
Dept: WOUND CARE | Facility: CLINIC | Age: 29
End: 2024-08-08
Payer: COMMERCIAL

## 2024-08-08 PROCEDURE — 99213 OFFICE O/P EST LOW 20 MIN: CPT

## 2024-08-08 PROCEDURE — 99213 OFFICE O/P EST LOW 20 MIN: CPT | Performed by: NURSE PRACTITIONER

## 2024-08-15 ENCOUNTER — APPOINTMENT (OUTPATIENT)
Dept: WOUND CARE | Facility: CLINIC | Age: 29
End: 2024-08-15
Payer: COMMERCIAL

## 2024-08-22 ENCOUNTER — PATIENT OUTREACH (OUTPATIENT)
Dept: CARE COORDINATION | Facility: CLINIC | Age: 29
End: 2024-08-22
Payer: COMMERCIAL

## 2024-08-22 NOTE — PROGRESS NOTES
Outreach call to patient to check in 30 days after hospital discharge to support smooth transition of care.  Left voicemail message with CM name and contact number.  CM has attempted 2 outreach calls without success. Patient has not responded to [employer] chatbox. No additional outreach needed at this time.     Temitope Mcdaniels RN/CM  [facility]  [phone]

## (undated) DEVICE — WOUND SYSTEM, DEBRIDEMENT & CLEANING, O.R DUOPAK

## (undated) DEVICE — TUBESET, HATCH PROBE

## (undated) DEVICE — COLLECTION/DELIVERY SYSTEM, COPAN ESWAB, REG SIZE SWAB

## (undated) DEVICE — DRAPE, TIBURON, SPLIT SHEET, REINF ADH STRIP, 77X108

## (undated) DEVICE — APPLICATOR, CHLORAPREP, W/ORANGE TINT, 26ML

## (undated) DEVICE — Device

## (undated) DEVICE — DRAPE, SHEET, THREE QUARTER, FAN FOLD, 57 X 77 IN